# Patient Record
Sex: MALE | Race: BLACK OR AFRICAN AMERICAN | Employment: FULL TIME | URBAN - METROPOLITAN AREA
[De-identification: names, ages, dates, MRNs, and addresses within clinical notes are randomized per-mention and may not be internally consistent; named-entity substitution may affect disease eponyms.]

---

## 2017-07-12 ENCOUNTER — TELEPHONE (OUTPATIENT)
Dept: OTOLARYNGOLOGY | Facility: CLINIC | Age: 47
End: 2017-07-12

## 2017-07-12 ENCOUNTER — OFFICE VISIT (OUTPATIENT)
Dept: OTOLARYNGOLOGY | Facility: CLINIC | Age: 47
End: 2017-07-12

## 2017-07-12 VITALS
TEMPERATURE: 99 F | WEIGHT: 302 LBS | SYSTOLIC BLOOD PRESSURE: 128 MMHG | BODY MASS INDEX: 40.02 KG/M2 | HEIGHT: 73 IN | DIASTOLIC BLOOD PRESSURE: 64 MMHG

## 2017-07-12 DIAGNOSIS — J01.90 ACUTE SINUSITIS, RECURRENCE NOT SPECIFIED, UNSPECIFIED LOCATION: Primary | ICD-10-CM

## 2017-07-12 PROCEDURE — 99212 OFFICE O/P EST SF 10 MIN: CPT | Performed by: OTOLARYNGOLOGY

## 2017-07-12 PROCEDURE — 99214 OFFICE O/P EST MOD 30 MIN: CPT | Performed by: OTOLARYNGOLOGY

## 2017-07-12 RX ORDER — AMOXICILLIN AND CLAVULANATE POTASSIUM 875; 125 MG/1; MG/1
1 TABLET, FILM COATED ORAL EVERY 12 HOURS
Qty: 20 TABLET | Refills: 0 | Status: SHIPPED | OUTPATIENT
Start: 2017-07-12 | End: 2017-12-29 | Stop reason: ALTCHOICE

## 2017-07-12 RX ORDER — MONTELUKAST SODIUM 10 MG/1
10 TABLET ORAL NIGHTLY
Qty: 30 TABLET | Refills: 3 | Status: SHIPPED | OUTPATIENT
Start: 2017-07-12 | End: 2018-02-20

## 2017-07-12 NOTE — TELEPHONE ENCOUNTER
Patient requesting Loratadine to be sent to new [facility] pharm location since patients local pharm is out of medication and will not have any until 07/20/17.  Please send thank you

## 2017-07-12 NOTE — PROGRESS NOTES
Calvin Montiel is a 55year old male.   Patient presents with:  Sinus Problem: patient presents for sinus infection has been taking otc antihistamine and nasal sparay for 3 weeks without relief      HISTORY OF PRESENT ILLNESS  He presents with a three-week his PHYSICAL EXAM    /64 (BP Location: Left arm, Patient Position: Sitting, Cuff Size: large)   Temp 98.6 °F (37 °C) (Tympanic)   Ht 6' 1\" (1.854 m)   Wt (!) 302 lb (137 kg)   BMI 39.84 kg/m²        Constitutional Normal Overall appearance -ov 50 MCG/ACT Nasal Suspension, 2 sprays by Nasal route daily. , Disp: 1 Bottle, Rfl: 11  ASSESSMENT AND PLAN    1. Acute sinusitis, recurrence not specified, unspecified location  Continue Flonase but use twice daily.   I will start him on Augmentin for 10 day

## 2017-07-19 NOTE — TELEPHONE ENCOUNTER
Patient states wants prescription loratadine-Pseudoephedrine sent to a different pharmacy. Confirmed with pharmacy originally sent- [facility], with Hamida Barron and prescription was never picked up.  Called [facility] pharmacy in [facility] spoke to Gilmar kerr

## 2017-09-18 ENCOUNTER — OFFICE VISIT (OUTPATIENT)
Dept: OTOLARYNGOLOGY | Facility: CLINIC | Age: 47
End: 2017-09-18

## 2017-09-18 VITALS
HEIGHT: 73 IN | DIASTOLIC BLOOD PRESSURE: 82 MMHG | BODY MASS INDEX: 40.56 KG/M2 | TEMPERATURE: 98 F | SYSTOLIC BLOOD PRESSURE: 134 MMHG | WEIGHT: 306 LBS

## 2017-09-18 DIAGNOSIS — J01.21 ACUTE RECURRENT ETHMOIDAL SINUSITIS: ICD-10-CM

## 2017-09-18 DIAGNOSIS — H65.113 ACUTE MUCOID OTITIS MEDIA OF BOTH EARS: Primary | ICD-10-CM

## 2017-09-18 PROCEDURE — 99212 OFFICE O/P EST SF 10 MIN: CPT | Performed by: OTOLARYNGOLOGY

## 2017-09-18 PROCEDURE — 99213 OFFICE O/P EST LOW 20 MIN: CPT | Performed by: OTOLARYNGOLOGY

## 2017-09-18 RX ORDER — METHYLPREDNISOLONE 4 MG/1
TABLET ORAL
Qty: 1 PACKAGE | Refills: 0 | Status: SHIPPED | OUTPATIENT
Start: 2017-09-18 | End: 2017-12-29 | Stop reason: ALTCHOICE

## 2017-09-18 RX ORDER — PROMETHAZINE HYDROCHLORIDE AND CODEINE PHOSPHATE 6.25; 1 MG/5ML; MG/5ML
2.5 SYRUP ORAL EVERY 4 HOURS PRN
Qty: 200 ML | Refills: 0 | Status: SHIPPED | OUTPATIENT
Start: 2017-09-18 | End: 2018-06-18 | Stop reason: ALTCHOICE

## 2017-09-18 RX ORDER — AMOXICILLIN AND CLAVULANATE POTASSIUM 875; 125 MG/1; MG/1
1 TABLET, FILM COATED ORAL 2 TIMES DAILY
Qty: 28 TABLET | Refills: 0 | Status: SHIPPED | OUTPATIENT
Start: 2017-09-18 | End: 2017-10-02

## 2017-09-18 NOTE — PROGRESS NOTES
Daryl Miners is a 55year old male. Patient presents with:  Sinus Problem: infection for about a week, clogged ears, bad cough at night, clogeed head      HISTORY OF PRESENT ILLNESS  9/18/2017  Patient prevents for evaluation of sinusitis.  This is recurren Frequent skin infections, pigment change and rash. Hema/Lymph Negative Easy bleeding and easy bruising.            PHYSICAL EXAM    /82 (BP Location: Left arm)   Temp 98 °F (36.7 °C) (Tympanic)   Ht 6' 1\" (1.854 m)   Wt (!) 306 lb (138.8 kg)   BMI Disp: 1 Package, Rfl: 0  •  Fluticasone Propionate (FLONASE) 50 MCG/ACT Nasal Suspension, 2 sprays by Nasal route daily. , Disp: 1 Bottle, Rfl: 11  •  Amoxicillin-Pot Clavulanate 875-125 MG Oral Tab, Take 1 tablet by mouth every 12 (twelve) hours. , Disp: 20

## 2017-12-01 ENCOUNTER — OFFICE VISIT (OUTPATIENT)
Dept: OTOLARYNGOLOGY | Facility: CLINIC | Age: 47
End: 2017-12-01

## 2017-12-01 VITALS
TEMPERATURE: 97 F | HEIGHT: 73 IN | WEIGHT: 310 LBS | SYSTOLIC BLOOD PRESSURE: 118 MMHG | DIASTOLIC BLOOD PRESSURE: 70 MMHG | BODY MASS INDEX: 41.08 KG/M2

## 2017-12-01 DIAGNOSIS — R22.1 NECK MASS: Primary | ICD-10-CM

## 2017-12-01 PROBLEM — J34.2 DEVIATED SEPTUM: Status: ACTIVE | Noted: 2017-12-01

## 2017-12-01 PROCEDURE — 99214 OFFICE O/P EST MOD 30 MIN: CPT | Performed by: OTOLARYNGOLOGY

## 2017-12-01 PROCEDURE — 99212 OFFICE O/P EST SF 10 MIN: CPT | Performed by: OTOLARYNGOLOGY

## 2017-12-01 NOTE — PROGRESS NOTES
Ray Hoyos is a 52year old male.   Patient presents with:  Throat Problem: per pt he noticed lump at his throat for a week  Sinus Problem: post nasal drip, runny nose and sneezing      HISTORY OF PRESENT ILLNESS  9/18/2017  Patient prevents for evaluation status: Never Smoker                                                                Smokeless tobacco: Never Used                        Alcohol use: Yes             Drug use:  Yes                Comment: mirjuana -once a month      No family history on gia Submandibular. Anterior cervical. Posterior cervical. Supraclavicular.   Palpation of the neck reveals some fullness in the hyoid on the left and is submitted the lines are normal his submental exam is normal and not sure if this is just thickening of the h antihistamines. With regard to his breathing he does have a very significantly deviated septum into the right nasal cavity with compensatory turbinate hypertrophy.   This is something that surgery would fix but given that it does not bother him that much I

## 2017-12-05 ENCOUNTER — TELEPHONE (OUTPATIENT)
Dept: OTOLARYNGOLOGY | Facility: CLINIC | Age: 47
End: 2017-12-05

## 2017-12-05 NOTE — TELEPHONE ENCOUNTER
Left message for pt to call back to inform that he can schedule CT soft tissue of neck at this time, authorization number [id number], valid 12-5-17 thru 1-3-18 to be done at [facility]. It is the patient's responsibility to verify benefits a

## 2017-12-15 ENCOUNTER — HOSPITAL ENCOUNTER (OUTPATIENT)
Dept: CT IMAGING | Facility: HOSPITAL | Age: 47
Discharge: HOME | End: 2017-12-15
Attending: OTOLARYNGOLOGY
Payer: COMMERCIAL

## 2017-12-15 DIAGNOSIS — R22.1 NECK MASS: ICD-10-CM

## 2017-12-15 PROCEDURE — 70491 CT SOFT TISSUE NECK W/DYE: CPT | Performed by: OTOLARYNGOLOGY

## 2017-12-15 PROCEDURE — 82565 ASSAY OF CREATININE: CPT

## 2017-12-29 ENCOUNTER — OFFICE VISIT (OUTPATIENT)
Dept: OTOLARYNGOLOGY | Facility: CLINIC | Age: 47
End: 2017-12-29

## 2017-12-29 VITALS
TEMPERATURE: 98 F | HEIGHT: 73 IN | WEIGHT: 310 LBS | BODY MASS INDEX: 41.08 KG/M2 | DIASTOLIC BLOOD PRESSURE: 77 MMHG | SYSTOLIC BLOOD PRESSURE: 132 MMHG

## 2017-12-29 DIAGNOSIS — R22.1 NECK MASS: Primary | ICD-10-CM

## 2017-12-29 PROCEDURE — 99214 OFFICE O/P EST MOD 30 MIN: CPT | Performed by: OTOLARYNGOLOGY

## 2017-12-29 PROCEDURE — 99212 OFFICE O/P EST SF 10 MIN: CPT | Performed by: OTOLARYNGOLOGY

## 2017-12-29 NOTE — PROGRESS NOTES
Arnel Verde is a 52year old male. Patient presents with:   Follow - Up: CT scan of soft tissue neck done 12/15/17  Follow - Up: sinus problem- post nasal drip for 4 days      HISTORY OF PRESENT ILLNESS  9/18/2017  Patient prevents for evaluation of sinusi Number of children:               Social History Main Topics    Smoking status: Never Smoker                                                                Smokeless tobacco: Never Used                        Alcohol use:  Yes             Drug use: Yes Normal Inspection - Normal.        Lymph Detail   Normal Submental. Submandibular. Anterior cervical. Posterior cervical. Supraclavicular.   Palpation of the neck reveals complete resolution of previously noted lymph node lymphadenopathy              Nose/M

## 2018-01-08 ENCOUNTER — OFFICE VISIT (OUTPATIENT)
Dept: OTOLARYNGOLOGY | Facility: CLINIC | Age: 48
End: 2018-01-08

## 2018-01-08 VITALS
WEIGHT: 310 LBS | DIASTOLIC BLOOD PRESSURE: 84 MMHG | BODY MASS INDEX: 41.08 KG/M2 | HEIGHT: 73 IN | SYSTOLIC BLOOD PRESSURE: 128 MMHG | TEMPERATURE: 98 F

## 2018-01-08 DIAGNOSIS — J01.21 ACUTE RECURRENT ETHMOIDAL SINUSITIS: Primary | ICD-10-CM

## 2018-01-08 PROCEDURE — 99212 OFFICE O/P EST SF 10 MIN: CPT | Performed by: OTOLARYNGOLOGY

## 2018-01-08 PROCEDURE — 99214 OFFICE O/P EST MOD 30 MIN: CPT | Performed by: OTOLARYNGOLOGY

## 2018-01-08 RX ORDER — DEXAMETHASONE 4 MG/1
TABLET ORAL
Qty: 6 TABLET | Refills: 0 | Status: SHIPPED | OUTPATIENT
Start: 2018-01-08 | End: 2018-06-18 | Stop reason: ALTCHOICE

## 2018-01-08 RX ORDER — AMOXICILLIN AND CLAVULANATE POTASSIUM 875; 125 MG/1; MG/1
1 TABLET, FILM COATED ORAL EVERY 12 HOURS
Qty: 20 TABLET | Refills: 0 | Status: SHIPPED | OUTPATIENT
Start: 2018-01-08 | End: 2018-02-06 | Stop reason: ALTCHOICE

## 2018-01-08 NOTE — PROGRESS NOTES
Iveth Norris is a 52year old male. Patient presents with:   Follow - Up: 2 week follow up- nasal congestion- per pt he still congested, clogged both ears, facial pressure for 4 days      HISTORY OF PRESENT ILLNESS  9/18/2017  Patient prevents for evaluatio like he has a lot of pressure also much worse over the past 4 days he wonders how he can avoid having what he thinks is sinus infections he is on Flonase daily but is not on singular daily    Social History    Marital status:              Spouse nam Cranial nerves - Cranial nerves II through XII grossly intact.  Gait is normal   Head/Face Normal Facial features - Normal. Eyebrows - Normal. Skull - Normal.             Ears Normal Inspection - Right: Normal, Left: Normal. Canal - Right: Normal, Left: Nor Singulair daily in addition to the Flonase and see if this helps could also do allergy testing should this fail to improve reduce the frequency of his infections patient was instructed to call if symptoms do not resolve.  The risks of steroids were discusse

## 2018-02-06 ENCOUNTER — OFFICE VISIT (OUTPATIENT)
Dept: OTOLARYNGOLOGY | Facility: CLINIC | Age: 48
End: 2018-02-06

## 2018-02-06 ENCOUNTER — NURSE TRIAGE (OUTPATIENT)
Dept: OTHER | Age: 48
End: 2018-02-06

## 2018-02-06 VITALS
HEIGHT: 73 IN | SYSTOLIC BLOOD PRESSURE: 120 MMHG | TEMPERATURE: 97 F | DIASTOLIC BLOOD PRESSURE: 78 MMHG | WEIGHT: 310 LBS | BODY MASS INDEX: 41.08 KG/M2

## 2018-02-06 DIAGNOSIS — R05.9 COUGH: Primary | ICD-10-CM

## 2018-02-06 PROCEDURE — 99212 OFFICE O/P EST SF 10 MIN: CPT | Performed by: OTOLARYNGOLOGY

## 2018-02-06 PROCEDURE — 99213 OFFICE O/P EST LOW 20 MIN: CPT | Performed by: OTOLARYNGOLOGY

## 2018-02-06 NOTE — PROGRESS NOTES
Divina Vega is a 52year old male. Patient presents with: Follow - Up: 1 month follow up- recurrent sinusitis/nasal congestion-per pt he got better and got worse again a week ago    HPI:   He had the flu last week.   He started to feel much better with [facility] Normal Lips - Normal, Tonsils - Normal, Tongue - Normal    Nasal Normal External nose - Normal. Nasal septum - Normal, Turbinates - Normal   Neurological Normal Memory - Normal. Cranial nerves - Cranial nerves II through XII grossly intact.    Neck Exam Nor

## 2018-02-06 NOTE — TELEPHONE ENCOUNTER
Action Requested: Summary for Provider     []  Critical Lab, Recommendations Needed  [] Need Additional Advice  [x]   FYI    []   Need Orders  [] Need Medications Sent to Pharmacy  []  Other     SUMMARY:  Pt called office to schedule appt with [facility] for SOB,

## 2018-02-20 RX ORDER — MONTELUKAST SODIUM 10 MG/1
TABLET ORAL
Qty: 30 TABLET | Refills: 3 | Status: SHIPPED | OUTPATIENT
Start: 2018-02-20 | End: 2020-08-22

## 2018-04-30 ENCOUNTER — HOSPITAL ENCOUNTER (OUTPATIENT)
Dept: GENERAL RADIOLOGY | Facility: HOSPITAL | Age: 48
Discharge: HOME | End: 2018-04-30
Attending: OTOLARYNGOLOGY
Payer: COMMERCIAL

## 2018-04-30 ENCOUNTER — OFFICE VISIT (OUTPATIENT)
Dept: OTOLARYNGOLOGY | Facility: CLINIC | Age: 48
End: 2018-04-30

## 2018-04-30 VITALS
SYSTOLIC BLOOD PRESSURE: 130 MMHG | WEIGHT: 315 LBS | BODY MASS INDEX: 41.75 KG/M2 | DIASTOLIC BLOOD PRESSURE: 80 MMHG | TEMPERATURE: 97 F | HEIGHT: 73 IN

## 2018-04-30 DIAGNOSIS — R05.9 COUGH: ICD-10-CM

## 2018-04-30 DIAGNOSIS — R05.9 COUGH: Primary | ICD-10-CM

## 2018-04-30 DIAGNOSIS — H61.21 IMPACTED CERUMEN OF RIGHT EAR: ICD-10-CM

## 2018-04-30 PROCEDURE — 71045 X-RAY EXAM CHEST 1 VIEW: CPT | Performed by: OTOLARYNGOLOGY

## 2018-04-30 PROCEDURE — 99212 OFFICE O/P EST SF 10 MIN: CPT | Performed by: OTOLARYNGOLOGY

## 2018-04-30 PROCEDURE — 99214 OFFICE O/P EST MOD 30 MIN: CPT | Performed by: OTOLARYNGOLOGY

## 2018-04-30 PROCEDURE — 69210 REMOVE IMPACTED EAR WAX UNI: CPT | Performed by: OTOLARYNGOLOGY

## 2018-04-30 RX ORDER — BENZONATATE 100 MG/1
CAPSULE ORAL
COMMUNITY
Start: 2018-04-25 | End: 2018-06-18 | Stop reason: ALTCHOICE

## 2018-04-30 RX ORDER — PROMETHAZINE HYDROCHLORIDE AND CODEINE PHOSPHATE 6.25; 1 MG/5ML; MG/5ML
2.5 SYRUP ORAL EVERY 4 HOURS PRN
Qty: 200 ML | Refills: 0 | Status: SHIPPED | OUTPATIENT
Start: 2018-04-30 | End: 2018-06-18 | Stop reason: ALTCHOICE

## 2018-04-30 RX ORDER — AMOXICILLIN AND CLAVULANATE POTASSIUM 875; 125 MG/1; MG/1
TABLET, FILM COATED ORAL
COMMUNITY
Start: 2018-04-25 | End: 2018-06-18 | Stop reason: ALTCHOICE

## 2018-04-30 RX ORDER — PREDNISONE 20 MG/1
TABLET ORAL
COMMUNITY
Start: 2018-04-25 | End: 2018-06-18 | Stop reason: ALTCHOICE

## 2018-04-30 NOTE — PROGRESS NOTES
Rubin Alvse is a 52year old male.   Patient presents with:  Sinus Problem: congestion in head, nasal drainage, feel like head is going to explode, was seen in [facility] by  Nurse Practitioner  Ear Problem: right ear pain since wednesday      HISTORY OF PRESE medication is better he has some wheezing and some nasal congestion so he has a cough that keeps him up at night doctor gave him an inhaler for his lungs but he still feels like he has some wheezing.     Social History    Marital status:  Left: Normal. Pupil - Right: Normal, Left: Normal. Fundus - Right: Normal, Left: Normal. EOMI   Neurological Normal Memory - Normal. Cranial nerves - Cranial nerves II through XII grossly intact.  Gait is normal   Head/Face Normal Facial features - Normal. PLAN    1 resolving sinusitis  Overall he looks improved he does have some concerns about his breathing and wheezing and so I did ask him to get a chest x-ray today and continue his current medications and I gave him a cough suppressant if his wheezing con

## 2018-06-18 ENCOUNTER — OFFICE VISIT (OUTPATIENT)
Dept: OTOLARYNGOLOGY | Facility: CLINIC | Age: 48
End: 2018-06-18

## 2018-06-18 VITALS
WEIGHT: 315 LBS | HEIGHT: 73 IN | SYSTOLIC BLOOD PRESSURE: 134 MMHG | TEMPERATURE: 98 F | BODY MASS INDEX: 41.75 KG/M2 | DIASTOLIC BLOOD PRESSURE: 82 MMHG

## 2018-06-18 DIAGNOSIS — J30.9 ALLERGIC RHINITIS, UNSPECIFIED SEASONALITY, UNSPECIFIED TRIGGER: Primary | ICD-10-CM

## 2018-06-18 PROCEDURE — 99214 OFFICE O/P EST MOD 30 MIN: CPT | Performed by: OTOLARYNGOLOGY

## 2018-06-18 PROCEDURE — 99212 OFFICE O/P EST SF 10 MIN: CPT | Performed by: OTOLARYNGOLOGY

## 2018-06-18 NOTE — PROGRESS NOTES
Divina Vega is a 52year old male. Patient presents with:  Sinus Problem: frequent sinus infections, every 3-4 months       HISTORY OF PRESENT ILLNESS  He presents with a three-week history of chronic nasal congestion as well as mucopurulence.   He has abo Blood Pressure Brother        Past Medical History:   Diagnosis Date   • Chronic sinusitis 2013   • Deviated nasal septum    • Diverticulosis    • Elevated blood pressure reading 2015   • GERD (gastroesophageal reflux disease)    • Hypercholesterolemia 201 Normal. TM - Right: Normal, Left: Normal.   Skin Normal Inspection - Normal.        Lymph Detail Normal Submental. Submandibular. Anterior cervical. Posterior cervical. Supraclavicular.         Nose/Mouth/Throat Normal External nose - Normal. Lips/teeth/gum initiate a CT scan of the sinuses to evaluate his nasal and paranasal anatomy. Physical exam did demonstrate a slight deviation of the septum to the right but he does have significant nasal mucosal congestion chronically. Agueda Dawson.  Monique Reyes MD

## 2019-04-02 ENCOUNTER — OFFICE VISIT (OUTPATIENT)
Dept: OTOLARYNGOLOGY | Facility: CLINIC | Age: 49
End: 2019-04-02
Payer: COMMERCIAL

## 2019-04-02 VITALS
SYSTOLIC BLOOD PRESSURE: 114 MMHG | TEMPERATURE: 98 F | WEIGHT: 315 LBS | HEIGHT: 73 IN | DIASTOLIC BLOOD PRESSURE: 77 MMHG | BODY MASS INDEX: 41.75 KG/M2

## 2019-04-02 DIAGNOSIS — J30.9 ALLERGIC RHINITIS, UNSPECIFIED SEASONALITY, UNSPECIFIED TRIGGER: Primary | ICD-10-CM

## 2019-04-02 DIAGNOSIS — J34.2 DEVIATED NASAL SEPTUM: ICD-10-CM

## 2019-04-02 PROCEDURE — 99214 OFFICE O/P EST MOD 30 MIN: CPT | Performed by: OTOLARYNGOLOGY

## 2019-04-02 PROCEDURE — 99212 OFFICE O/P EST SF 10 MIN: CPT | Performed by: OTOLARYNGOLOGY

## 2019-04-02 RX ORDER — LEVOCETIRIZINE DIHYDROCHLORIDE 5 MG/1
5 TABLET, FILM COATED ORAL EVERY EVENING
Qty: 30 TABLET | Refills: 3 | Status: SHIPPED | OUTPATIENT
Start: 2019-04-02 | End: 2021-01-22

## 2019-04-02 NOTE — PROGRESS NOTES
Ion Fragoso is a 50year old male. Patient presents with:  Sinus Problem: pt reports recurring sinus imfections, ct scan done on 3/18/19      HISTORY OF PRESENT ILLNESS  He presents with a recent CT scan performed in the last 2 weeks.   States that he has pain and diarrhea. Endocrine Negative Cold intolerance and heat intolerance. Neuro Negative Tremors. Psych Negative Anxiety and depression. Integumentary Negative Frequent skin infections, pigment change and rash.    Hema/Lymph Negative Easy bleedin Nasal Suspension, 2 sprays by Nasal route daily. , Disp: 1 Bottle, Rfl: 0  •  MONTELUKAST SODIUM 10 MG Oral Tab, TAKE 1 TABLET BY MOUTH AT BEDTIME, Disp: 30 tablet, Rfl: 3  •  Loratadine-Pseudoephedrine ER 5-120 MG Oral Tablet 12 Hr, Take 1 tablet by mouth

## 2019-05-03 PROBLEM — M75.40 IMPINGEMENT SYNDROME OF SHOULDER REGION: Status: ACTIVE | Noted: 2019-02-06

## 2019-05-03 PROBLEM — G47.33 OBSTRUCTIVE SLEEP APNEA SYNDROME: Status: ACTIVE | Noted: 2018-06-07

## 2019-05-03 PROBLEM — M25.569 KNEE PAIN: Status: ACTIVE | Noted: 2019-02-06

## 2019-05-03 PROBLEM — J45.20 MILD INTERMITTENT ASTHMA WITHOUT COMPLICATION (HCC): Status: ACTIVE | Noted: 2019-05-03

## 2019-05-03 PROBLEM — K62.5 RECTAL BLEEDING: Status: ACTIVE | Noted: 2019-05-03

## 2019-05-03 PROBLEM — E55.9 VITAMIN D DEFICIENCY: Status: ACTIVE | Noted: 2018-06-07

## 2019-05-03 PROBLEM — E66.01 MORBID OBESITY (HCC): Status: ACTIVE | Noted: 2019-02-06

## 2019-05-03 PROBLEM — J45.20 MILD INTERMITTENT ASTHMA WITHOUT COMPLICATION: Status: ACTIVE | Noted: 2019-05-03

## 2019-05-13 ENCOUNTER — TELEPHONE (OUTPATIENT)
Dept: OTOLARYNGOLOGY | Facility: CLINIC | Age: 49
End: 2019-05-13

## 2019-05-13 NOTE — TELEPHONE ENCOUNTER
Pt contacted, cancelled surgery schedule for 5/20 with Dr. Duane Meyer. Pt will call back to reschedule once the medical clearance has been completed.

## 2019-07-30 ENCOUNTER — TELEPHONE (OUTPATIENT)
Dept: OTOLARYNGOLOGY | Facility: CLINIC | Age: 49
End: 2019-07-30

## 2019-07-30 NOTE — TELEPHONE ENCOUNTER
Called and spoke with patient. Informed patient last time we spoke with patient we had cancelled sx on 05/20 and was advised to get Medical clearance prior to rescheduling surgery. Patient remembered. Patient states will have pre op appt on 09/09/19.  Per

## 2019-09-10 ENCOUNTER — TELEPHONE (OUTPATIENT)
Dept: OTOLARYNGOLOGY | Facility: CLINIC | Age: 49
End: 2019-09-10

## 2019-09-10 NOTE — TELEPHONE ENCOUNTER
office requesting to speak with RN. [facility] pt is in office for pre-op clearance and did not bring any OV notes. Wants to know what would the pt be tested for?

## 2019-09-10 NOTE — TELEPHONE ENCOUNTER
Rn spoke to PCP staff and  Advised that we need a medical clearance from Dr Sherrie Del Toro before surgery.

## 2019-10-28 ENCOUNTER — TELEPHONE (OUTPATIENT)
Dept: OTOLARYNGOLOGY | Facility: CLINIC | Age: 49
End: 2019-10-28

## 2019-10-28 NOTE — TELEPHONE ENCOUNTER
Called Dr Jessica James office to request medical clearance to be faxed to our office. Called patient to inform medical clearance for surgery was never received from PCPs office.  Informed patient that I did leave a message to PCPs office to fax medical clearan

## 2019-10-28 NOTE — TELEPHONE ENCOUNTER
Pt requesting to speak with RN to know if the office have everything that is needed for medical clearance to proceed with sx.

## 2019-10-29 NOTE — TELEPHONE ENCOUNTER
Per Dr Kyle Blas patient will also need to come back to clinic to costa surgery since LOV was on 4/2/19    Called patient to inform.  Patient set up appt for 10/31/19 at 3pm to costa evans

## 2019-10-29 NOTE — TELEPHONE ENCOUNTER
Late entry: spoke with Dr Hadley James MA on 10/28/19 states patient is still not cleared for surgery due to not having lab work done.  Per MA patient is to come into office today to complete lab work and pending results may then clear patient for surgery

## 2019-10-31 ENCOUNTER — OFFICE VISIT (OUTPATIENT)
Dept: OTOLARYNGOLOGY | Facility: CLINIC | Age: 49
End: 2019-10-31
Payer: COMMERCIAL

## 2019-10-31 VITALS
TEMPERATURE: 98 F | SYSTOLIC BLOOD PRESSURE: 115 MMHG | DIASTOLIC BLOOD PRESSURE: 80 MMHG | BODY MASS INDEX: 41.75 KG/M2 | WEIGHT: 315 LBS | HEIGHT: 73 IN

## 2019-10-31 DIAGNOSIS — J30.9 ALLERGIC RHINITIS, UNSPECIFIED SEASONALITY, UNSPECIFIED TRIGGER: ICD-10-CM

## 2019-10-31 DIAGNOSIS — J34.2 DEVIATED NASAL SEPTUM: Primary | ICD-10-CM

## 2019-10-31 PROCEDURE — 99214 OFFICE O/P EST MOD 30 MIN: CPT | Performed by: OTOLARYNGOLOGY

## 2019-11-08 ENCOUNTER — TELEPHONE (OUTPATIENT)
Dept: OTOLARYNGOLOGY | Facility: CLINIC | Age: 49
End: 2019-11-08

## 2019-11-08 NOTE — PROGRESS NOTES
London Peters is a 52year old male. Patient presents with: Follow - Up: deviated septum- LOV-4/2/19      HISTORY OF PRESENT ILLNESS  He presents with a recent CT scan performed in the last 2 weeks.   States that he has primary difficulty breathing Other (Diverticulitis) Father        Past Medical History:   Diagnosis Date   • Chronic sinusitis 2013   • Deviated nasal septum    • Diverticulosis    • Elevated blood pressure reading 2015   • GERD (gastroesophageal reflux disease)    • History of divert Normal. Oropharynx - Normal.   Ears Normal Inspection - Right: Normal, Left: Normal. Canal - Right: Normal, Left: Normal. TM - Right: Normal, Left: Normal.   Skin Normal Inspection - Normal.        Lymph Detail Normal Submental. Submandibular.  Anterior cer Allergy medications have helped slightly. We had talked about surgery back in April of this year but unfortunately things came up and he was unable to proceed with surgery. No real changes in his history and physical at this time.   We once again discusse

## 2019-11-21 RX ORDER — ACETAMINOPHEN 325 MG/1
325 TABLET ORAL EVERY 6 HOURS PRN
COMMUNITY
End: 2020-02-06

## 2019-11-25 ENCOUNTER — ANESTHESIA EVENT (OUTPATIENT)
Dept: SURGERY | Facility: HOSPITAL | Age: 49
End: 2019-11-25
Payer: COMMERCIAL

## 2019-11-25 ENCOUNTER — ANESTHESIA (OUTPATIENT)
Dept: SURGERY | Facility: HOSPITAL | Age: 49
End: 2019-11-25
Payer: COMMERCIAL

## 2019-11-25 ENCOUNTER — HOSPITAL ENCOUNTER (OUTPATIENT)
Facility: HOSPITAL | Age: 49
Setting detail: HOSPITAL OUTPATIENT SURGERY
Discharge: HOME | End: 2019-11-25
Attending: OTOLARYNGOLOGY | Admitting: OTOLARYNGOLOGY
Payer: COMMERCIAL

## 2019-11-25 VITALS
WEIGHT: 315 LBS | TEMPERATURE: 98 F | RESPIRATION RATE: 18 BRPM | OXYGEN SATURATION: 92 % | DIASTOLIC BLOOD PRESSURE: 84 MMHG | HEART RATE: 84 BPM | SYSTOLIC BLOOD PRESSURE: 148 MMHG | HEIGHT: 73 IN | BODY MASS INDEX: 41.75 KG/M2

## 2019-11-25 DIAGNOSIS — J34.2 DEVIATED NASAL SEPTUM: ICD-10-CM

## 2019-11-25 DIAGNOSIS — J34.3 HYPERTROPHY OF NASAL TURBINATES: ICD-10-CM

## 2019-11-25 PROCEDURE — 09SL7ZZ REPOSITION NASAL TURBINATE, VIA NATURAL OR ARTIFICIAL OPENING: ICD-10-PCS | Performed by: OTOLARYNGOLOGY

## 2019-11-25 PROCEDURE — 30520 REPAIR OF NASAL SEPTUM: CPT | Performed by: OTOLARYNGOLOGY

## 2019-11-25 PROCEDURE — 30140 RESECT INFERIOR TURBINATE: CPT | Performed by: OTOLARYNGOLOGY

## 2019-11-25 PROCEDURE — 09BM0ZZ EXCISION OF NASAL SEPTUM, OPEN APPROACH: ICD-10-PCS | Performed by: OTOLARYNGOLOGY

## 2019-11-25 PROCEDURE — 095L7ZZ DESTRUCTION OF NASAL TURBINATE, VIA NATURAL OR ARTIFICIAL OPENING: ICD-10-PCS | Performed by: OTOLARYNGOLOGY

## 2019-11-25 RX ORDER — HYDROCODONE BITARTRATE AND ACETAMINOPHEN 5; 325 MG/1; MG/1
1 TABLET ORAL EVERY 4 HOURS PRN
Status: DISCONTINUED | OUTPATIENT
Start: 2019-11-25 | End: 2019-11-25

## 2019-11-25 RX ORDER — ACETAMINOPHEN 325 MG/1
650 TABLET ORAL EVERY 4 HOURS PRN
Status: DISCONTINUED | OUTPATIENT
Start: 2019-11-25 | End: 2019-11-25

## 2019-11-25 RX ORDER — HYDROMORPHONE HYDROCHLORIDE 1 MG/ML
0.2 INJECTION, SOLUTION INTRAMUSCULAR; INTRAVENOUS; SUBCUTANEOUS EVERY 5 MIN PRN
Status: DISCONTINUED | OUTPATIENT
Start: 2019-11-25 | End: 2019-11-25

## 2019-11-25 RX ORDER — CEPHALEXIN 500 MG/1
500 CAPSULE ORAL EVERY 8 HOURS
Qty: 21 CAPSULE | Refills: 0 | Status: SHIPPED | OUTPATIENT
Start: 2019-11-25 | End: 2020-02-06 | Stop reason: ALTCHOICE

## 2019-11-25 RX ORDER — PROCHLORPERAZINE EDISYLATE 5 MG/ML
5 INJECTION INTRAMUSCULAR; INTRAVENOUS ONCE AS NEEDED
Status: DISCONTINUED | OUTPATIENT
Start: 2019-11-25 | End: 2019-11-25

## 2019-11-25 RX ORDER — ACETAMINOPHEN 160 MG/5ML
650 SOLUTION ORAL EVERY 4 HOURS PRN
Status: DISCONTINUED | OUTPATIENT
Start: 2019-11-25 | End: 2019-11-25

## 2019-11-25 RX ORDER — DEXAMETHASONE SODIUM PHOSPHATE 4 MG/ML
VIAL (ML) INJECTION AS NEEDED
Status: DISCONTINUED | OUTPATIENT
Start: 2019-11-25 | End: 2019-11-25 | Stop reason: SURG

## 2019-11-25 RX ORDER — MORPHINE SULFATE 2 MG/ML
2 INJECTION, SOLUTION INTRAMUSCULAR; INTRAVENOUS EVERY 10 MIN PRN
Status: DISCONTINUED | OUTPATIENT
Start: 2019-11-25 | End: 2019-11-25

## 2019-11-25 RX ORDER — HYDROCODONE BITARTRATE AND ACETAMINOPHEN 7.5; 325 MG/1; MG/1
1 TABLET ORAL EVERY 6 HOURS PRN
Qty: 30 TABLET | Refills: 0 | Status: SHIPPED | OUTPATIENT
Start: 2019-11-25 | End: 2020-02-06 | Stop reason: ALTCHOICE

## 2019-11-25 RX ORDER — ONDANSETRON 2 MG/ML
INJECTION INTRAMUSCULAR; INTRAVENOUS AS NEEDED
Status: DISCONTINUED | OUTPATIENT
Start: 2019-11-25 | End: 2019-11-25 | Stop reason: SURG

## 2019-11-25 RX ORDER — FAMOTIDINE 20 MG/1
20 TABLET ORAL ONCE
Status: COMPLETED | OUTPATIENT
Start: 2019-11-25 | End: 2019-11-25

## 2019-11-25 RX ORDER — HYDROCODONE BITARTRATE AND ACETAMINOPHEN 5; 325 MG/1; MG/1
1 TABLET ORAL AS NEEDED
Status: DISCONTINUED | OUTPATIENT
Start: 2019-11-25 | End: 2019-11-25

## 2019-11-25 RX ORDER — NALOXONE HYDROCHLORIDE 0.4 MG/ML
80 INJECTION, SOLUTION INTRAMUSCULAR; INTRAVENOUS; SUBCUTANEOUS AS NEEDED
Status: DISCONTINUED | OUTPATIENT
Start: 2019-11-25 | End: 2019-11-25

## 2019-11-25 RX ORDER — LIDOCAINE HYDROCHLORIDE AND EPINEPHRINE 10; 10 MG/ML; UG/ML
INJECTION, SOLUTION INFILTRATION; PERINEURAL AS NEEDED
Status: DISCONTINUED | OUTPATIENT
Start: 2019-11-25 | End: 2019-11-25 | Stop reason: HOSPADM

## 2019-11-25 RX ORDER — SODIUM CHLORIDE 0.9 % (FLUSH) 0.9 %
10 SYRINGE (ML) INJECTION AS NEEDED
Status: DISCONTINUED | OUTPATIENT
Start: 2019-11-25 | End: 2019-11-25

## 2019-11-25 RX ORDER — ONDANSETRON 4 MG/1
4 TABLET, ORALLY DISINTEGRATING ORAL EVERY 6 HOURS PRN
Status: DISCONTINUED | OUTPATIENT
Start: 2019-11-25 | End: 2019-11-25

## 2019-11-25 RX ORDER — HALOPERIDOL 5 MG/ML
0.25 INJECTION INTRAMUSCULAR ONCE AS NEEDED
Status: DISCONTINUED | OUTPATIENT
Start: 2019-11-25 | End: 2019-11-25

## 2019-11-25 RX ORDER — SODIUM CHLORIDE, SODIUM LACTATE, POTASSIUM CHLORIDE, CALCIUM CHLORIDE 600; 310; 30; 20 MG/100ML; MG/100ML; MG/100ML; MG/100ML
INJECTION, SOLUTION INTRAVENOUS CONTINUOUS
Status: DISCONTINUED | OUTPATIENT
Start: 2019-11-25 | End: 2019-11-25

## 2019-11-25 RX ORDER — MORPHINE SULFATE 10 MG/ML
6 INJECTION, SOLUTION INTRAMUSCULAR; INTRAVENOUS EVERY 10 MIN PRN
Status: DISCONTINUED | OUTPATIENT
Start: 2019-11-25 | End: 2019-11-25

## 2019-11-25 RX ORDER — GLYCOPYRROLATE 0.2 MG/ML
INJECTION INTRAMUSCULAR; INTRAVENOUS AS NEEDED
Status: DISCONTINUED | OUTPATIENT
Start: 2019-11-25 | End: 2019-11-25 | Stop reason: SURG

## 2019-11-25 RX ORDER — HYDROMORPHONE HYDROCHLORIDE 1 MG/ML
0.4 INJECTION, SOLUTION INTRAMUSCULAR; INTRAVENOUS; SUBCUTANEOUS EVERY 5 MIN PRN
Status: DISCONTINUED | OUTPATIENT
Start: 2019-11-25 | End: 2019-11-25

## 2019-11-25 RX ORDER — ACETAMINOPHEN 500 MG
1000 TABLET ORAL ONCE
Status: COMPLETED | OUTPATIENT
Start: 2019-11-25 | End: 2019-11-25

## 2019-11-25 RX ORDER — HYDROMORPHONE HYDROCHLORIDE 1 MG/ML
0.6 INJECTION, SOLUTION INTRAMUSCULAR; INTRAVENOUS; SUBCUTANEOUS EVERY 5 MIN PRN
Status: DISCONTINUED | OUTPATIENT
Start: 2019-11-25 | End: 2019-11-25

## 2019-11-25 RX ORDER — ONDANSETRON 2 MG/ML
4 INJECTION INTRAMUSCULAR; INTRAVENOUS EVERY 6 HOURS PRN
Status: DISCONTINUED | OUTPATIENT
Start: 2019-11-25 | End: 2019-11-25

## 2019-11-25 RX ORDER — MORPHINE SULFATE 4 MG/ML
4 INJECTION, SOLUTION INTRAMUSCULAR; INTRAVENOUS EVERY 10 MIN PRN
Status: DISCONTINUED | OUTPATIENT
Start: 2019-11-25 | End: 2019-11-25

## 2019-11-25 RX ORDER — LIDOCAINE HYDROCHLORIDE 10 MG/ML
INJECTION, SOLUTION EPIDURAL; INFILTRATION; INTRACAUDAL; PERINEURAL AS NEEDED
Status: DISCONTINUED | OUTPATIENT
Start: 2019-11-25 | End: 2019-11-25 | Stop reason: SURG

## 2019-11-25 RX ORDER — ONDANSETRON 2 MG/ML
4 INJECTION INTRAMUSCULAR; INTRAVENOUS ONCE AS NEEDED
Status: DISCONTINUED | OUTPATIENT
Start: 2019-11-25 | End: 2019-11-25

## 2019-11-25 RX ORDER — HYDROCODONE BITARTRATE AND ACETAMINOPHEN 5; 325 MG/1; MG/1
2 TABLET ORAL AS NEEDED
Status: DISCONTINUED | OUTPATIENT
Start: 2019-11-25 | End: 2019-11-25

## 2019-11-25 RX ORDER — METOCLOPRAMIDE 10 MG/1
10 TABLET ORAL ONCE
Status: COMPLETED | OUTPATIENT
Start: 2019-11-25 | End: 2019-11-25

## 2019-11-25 RX ADMIN — LIDOCAINE HYDROCHLORIDE 50 MG: 10 INJECTION, SOLUTION EPIDURAL; INFILTRATION; INTRACAUDAL; PERINEURAL at 13:19:00

## 2019-11-25 RX ADMIN — DEXAMETHASONE SODIUM PHOSPHATE 4 MG: 4 MG/ML VIAL (ML) INJECTION at 13:25:00

## 2019-11-25 RX ADMIN — SODIUM CHLORIDE, SODIUM LACTATE, POTASSIUM CHLORIDE, CALCIUM CHLORIDE: 600; 310; 30; 20 INJECTION, SOLUTION INTRAVENOUS at 14:00:00

## 2019-11-25 RX ADMIN — ONDANSETRON 4 MG: 2 INJECTION INTRAMUSCULAR; INTRAVENOUS at 13:25:00

## 2019-11-25 RX ADMIN — GLYCOPYRROLATE 0.2 MG: 0.2 INJECTION INTRAMUSCULAR; INTRAVENOUS at 13:18:00

## 2019-11-25 NOTE — INTERVAL H&P NOTE
Pre-op Diagnosis: Deviated nasal septum [J34.2]  Hypertrophy of nasal turbinates [J34.3]    The above referenced H&P was reviewed by Agueda Dawson.  Monique Reyes MD on 11/25/2019, the patient was examined and no significant changes have occurred in the patient's condit

## 2019-11-25 NOTE — ANESTHESIA POSTPROCEDURE EVALUATION
Patient: London Peters    Procedure Summary     Date:  11/25/19 Room / Location:  [address] [place] [address] MAIN OR 03 / [facility] MAIN OR    Anesthesia Start:  1738 Anesthesia Stop:  2257    Procedure:  NASAL SEPTOPLASTY TURBINECTOMY (Bilateral Nose) Diagnosis:       Deviated

## 2019-11-25 NOTE — ANESTHESIA PROCEDURE NOTES
Airway  Urgency: Elective      General Information and Staff    Patient location during procedure: OR  Anesthesiologist: Anna Cheng MD  Resident/CRNA: Jericho Caban, CRNA  Performed: CRNA     Indications and Patient Condition  Indications for airway lake

## 2019-11-25 NOTE — H&P
HISTORY OF PRESENT ILLNESS  He presents with a recent CT scan performed in the last 2 weeks.  States that he has primary difficulty breathing through his nose but also complains of chronic sneezing congestive issues.  Currently on Singulair and a nasal spr 2013   • Deviated nasal septum     • Diverticulosis     • Elevated blood pressure reading 2015   • GERD (gastroesophageal reflux disease)     • History of diverticulitis     • Hypercholesterolemia 2013   • Morbid obesity due to excess calories [facility])     • Normal. Canal - Right: Normal, Left: Normal. TM - Right: Normal, Left: Normal.   Skin Normal Inspection - Normal.           Lymph Detail Normal Submental. Submandibular. Anterior cervical. Posterior cervical. Supraclavicular.    Respiratory    normal auscul back in April of this year but unfortunately things came up and he was unable to proceed with surgery. No real changes in his history and physical at this time.   We once again discussed the risks of surgery to correct his deviated septum and to reduce his

## 2019-11-25 NOTE — ANESTHESIA PREPROCEDURE EVALUATION
Anesthesia PreOp Note    HPI:     Nano Burns is a 52year old male who presents for preoperative consultation requested by: Tsering Ludwig MD    Date of Surgery: 11/25/2019    Procedure(s):  NASAL SEPTOPLASTY TURBINECTOMY  Indication: Deviated n apnea     uses CPAP   • Torn meniscus     LEFT LEG   • Vitamin D deficiency 6/7/2018       Past Surgical History:   Procedure Laterality Date   • COLONOSCOPY         acetaminophen 325 MG Oral Tab, Take 325 mg by mouth every 6 (six) hours as needed for Pain Activity      Alcohol use: Yes        Frequency: 2-4 times a month      Drug use: Not Currently      Sexual activity: Not on file    Lifestyle      Physical activity:        Days per week: Not on file        Minutes per session: Not on file      Stress: No normal.               Anesthesia Plan:   ASA:  2  Plan:   General  Airway:  ETT and Video laryngoscope  Informed Consent Plan and Risks Discussed With:  Patient and spouse  Discussed plan with:  CRNA, attending and surgeon  Provider Attestation (if preop d

## 2019-11-25 NOTE — BRIEF OP NOTE
Pre-Operative Diagnosis: Deviated nasal septum [J34.2]  Hypertrophy of nasal turbinates [J34.3]     Post-Operative Diagnosis: Deviated nasal septum [J79. 2]Hypertrophy of nasal turbinates [J34.3]      Procedure Performed:   Procedure(s):  Septoplasty,Bilate

## 2019-11-26 ENCOUNTER — TELEPHONE (OUTPATIENT)
Dept: OTOLARYNGOLOGY | Facility: CLINIC | Age: 49
End: 2019-11-26

## 2019-11-26 NOTE — TELEPHONE ENCOUNTER
Pt is post op day 1 , septoplasty, SMR. Per  Pt pt is doing well, still having some light bleeding, advised pt no bending or heavy lifting for the next week and pt is not to blow nose until seen by .  Advised pt she can start using OTC saline nasal

## 2019-12-02 NOTE — OPERATIVE REPORT
[facility]    PATIENT'S NAME: Linnea AVILA   ATTENDING PHYSICIAN: Vel Monreal. Precious Méndez MD   OPERATING PHYSICIAN: Vel Monreal.  Precious Méndez MD   PATIENT ACCOUNT#:   [id number]    LOCATION:  [address]  MEDICAL RECORD #:   [id number]       DATE osteotome, and a  Dillon elevator. The mucoperichondrial flaps were then placed into midline position and sutured into this position using a 4-0 plain gut transeptal  mattress stitch.   The hemitransfixion incision on the right was then closed using a 5-0 f

## 2019-12-03 ENCOUNTER — OFFICE VISIT (OUTPATIENT)
Dept: OTOLARYNGOLOGY | Facility: CLINIC | Age: 49
End: 2019-12-03
Payer: COMMERCIAL

## 2019-12-03 VITALS
DIASTOLIC BLOOD PRESSURE: 72 MMHG | HEIGHT: 73 IN | WEIGHT: 315 LBS | TEMPERATURE: 98 F | SYSTOLIC BLOOD PRESSURE: 122 MMHG | BODY MASS INDEX: 41.75 KG/M2

## 2019-12-03 DIAGNOSIS — J34.2 DEVIATED NASAL SEPTUM: Primary | ICD-10-CM

## 2019-12-03 DIAGNOSIS — J30.9 ALLERGIC RHINITIS, UNSPECIFIED SEASONALITY, UNSPECIFIED TRIGGER: ICD-10-CM

## 2019-12-03 PROCEDURE — 99024 POSTOP FOLLOW-UP VISIT: CPT | Performed by: OTOLARYNGOLOGY

## 2019-12-03 NOTE — PROGRESS NOTES
Bee Holloway is a 52year old male.   Patient presents with:  Post-Op: septoplasty, smr done on 11/25/19      HISTORY OF PRESENT ILLNESS  He presents with a recent CT scan performed in the last 2 weeks.  States that he has primary difficulty breathi Not Currently      Family History   Problem Relation Age of Onset   • Diabetes Brother    • High Blood Pressure Brother    • Heart Disorder Brother    • Hypertension Brother    • Hypertension Father    • Other (Diverticulitis) Father        Past Medical Hi situation. Appropriate mood and affect.    Neck Exam Normal Inspection - Normal. Palpation - Normal. Parotid gland - Normal. Thyroid gland - Normal.   Eyes Normal Conjunctiva - Right: Normal, Left: Normal. Pupil - Right: Normal, Left: Normal. Fundus - Right taking: Reported on 12/3/2019 ), Disp: 30 tablet, Rfl: 0  ASSESSMENT AND PLAN    1. Deviated nasal septum    2. Allergic rhinitis, unspecified seasonality, unspecified trigger  Septum midline. Nose cleaned bilaterally breathing much better.   Return to see

## 2019-12-31 ENCOUNTER — TELEPHONE (OUTPATIENT)
Dept: OTOLARYNGOLOGY | Facility: CLINIC | Age: 49
End: 2019-12-31

## 2019-12-31 NOTE — TELEPHONE ENCOUNTER
pt wants to know what can he take for congestion in ear and sinus, which pt. Has had symptoms for the past week? Pt.states that he had surgery on 11/18/19.

## 2020-01-02 NOTE — TELEPHONE ENCOUNTER
Informed patient of note below and verbalized understanding and advised to call our office if no better.

## 2020-02-06 ENCOUNTER — OFFICE VISIT (OUTPATIENT)
Dept: INTERNAL MEDICINE CLINIC | Facility: CLINIC | Age: 50
End: 2020-02-06
Payer: COMMERCIAL

## 2020-02-06 VITALS
HEIGHT: 71.5 IN | WEIGHT: 315 LBS | TEMPERATURE: 99 F | RESPIRATION RATE: 22 BRPM | SYSTOLIC BLOOD PRESSURE: 128 MMHG | OXYGEN SATURATION: 95 % | DIASTOLIC BLOOD PRESSURE: 84 MMHG | BODY MASS INDEX: 43.13 KG/M2 | HEART RATE: 76 BPM

## 2020-02-06 DIAGNOSIS — E78.5 HYPERLIPIDEMIA, UNSPECIFIED HYPERLIPIDEMIA TYPE: ICD-10-CM

## 2020-02-06 DIAGNOSIS — E66.01 MORBID OBESITY (HCC): ICD-10-CM

## 2020-02-06 DIAGNOSIS — E55.9 VITAMIN D DEFICIENCY: Primary | ICD-10-CM

## 2020-02-06 DIAGNOSIS — K57.90 DIVERTICULOSIS: ICD-10-CM

## 2020-02-06 DIAGNOSIS — I48.0 PAROXYSMAL ATRIAL FIBRILLATION (HCC): ICD-10-CM

## 2020-02-06 DIAGNOSIS — G89.29 CHRONIC PAIN OF RIGHT KNEE: ICD-10-CM

## 2020-02-06 DIAGNOSIS — G47.33 OBSTRUCTIVE SLEEP APNEA SYNDROME: ICD-10-CM

## 2020-02-06 DIAGNOSIS — M25.561 CHRONIC PAIN OF RIGHT KNEE: ICD-10-CM

## 2020-02-06 PROCEDURE — 99205 OFFICE O/P NEW HI 60 MIN: CPT | Performed by: INTERNAL MEDICINE

## 2020-02-06 RX ORDER — MELOXICAM 15 MG/1
15 TABLET ORAL DAILY PRN
Status: ON HOLD | COMMUNITY
Start: 2020-01-23 | End: 2020-04-11

## 2020-02-07 NOTE — ASSESSMENT & PLAN NOTE
History of diverticulosis. Recent colonoscopy reviewed. Will need a follow-up colonoscopy in about 3 years. Colonoscopy due on 06/14/2022  Patient is otherwise asymptomatic.   Patient is advised to start on a fiber supplement– Metamucil, Citrucel or Bene

## 2020-02-07 NOTE — ASSESSMENT & PLAN NOTE
Body mass index is 46.62 kg/m².      Wt Readings from Last 6 Encounters:  02/06/20 : (!) 339 lb (153.8 kg)  12/03/19 : (!) 330 lb (149.7 kg)  11/25/19 : (!) 330 lb 8 oz (149.9 kg)  10/31/19 : (!) 330 lb (149.7 kg)  05/03/19 : (!) 334 lb 6.4 oz (151.7 kg)  0

## 2020-02-07 NOTE — ASSESSMENT & PLAN NOTE
History of paroxysmal atrial fibrillation–long time back. No significant recurrences recently. Last records not available here for evaluation. Patient will have some of his old records forwarded.

## 2020-02-07 NOTE — ASSESSMENT & PLAN NOTE
Patient is currently on a CPAP, has been tolerating the CPAP better after septoplasty. He has not had a recent echocardiogram–will need a follow-up echo to monitor.

## 2020-02-07 NOTE — PATIENT INSTRUCTIONS
Problem List Items Addressed This Visit        Unprioritized    Diverticulosis     History of diverticulosis. Recent colonoscopy reviewed. Will need a follow-up colonoscopy in about 3 years.   Colonoscopy due on 06/14/2022  Patient is otherwise asymptomat improve exercise tolerance. Relevant Orders    CARD ECHO 2D DOPPLER (CPT=93306)    Obstructive sleep apnea syndrome     Patient is currently on a CPAP, has been tolerating the CPAP better after septoplasty.   He has not had a recent echocardiogram–w

## 2020-02-07 NOTE — ASSESSMENT & PLAN NOTE
Soreness in the left knee, has been through meniscal repair surgery in December 2019. Continues to have some restriction in range of movement. Exercises discussed. Continue on physical therapy as discussed.

## 2020-02-07 NOTE — ASSESSMENT & PLAN NOTE
History of vitamin D deficiency. Last labs completed in July of last year. Recheck labs have been ordered.

## 2020-02-12 ENCOUNTER — TELEPHONE (OUTPATIENT)
Dept: OTOLARYNGOLOGY | Facility: CLINIC | Age: 50
End: 2020-02-12

## 2020-02-12 NOTE — TELEPHONE ENCOUNTER
Per pt now not having any bleeding now , bleeding has stopped. Lasted about 15 min. Pt states just sneezed. Reviewed nose bleed precautions. Pt states he was using flonase nasal spray due to having post nasal drip and blowing nose frequently.  Advised pt no

## 2020-02-12 NOTE — TELEPHONE ENCOUNTER
Per pt had surgery in November. States he sneezed today and a blood started gushing out about 15 minutes ago. States took allergy medication and flonaze.  Please advise

## 2020-02-12 NOTE — TELEPHONE ENCOUNTER
, pt states has been having a lot of post nasal drip, pt using OTC allergy medication does not know med and Singulair, any other recommendations ?

## 2020-02-13 LAB
ABSOLUTE BASOPHILS: 50 CELLS/UL (ref 0–200)
ABSOLUTE EOSINOPHILS: 193 CELLS/UL (ref 15–500)
ABSOLUTE LYMPHOCYTES: 2591 CELLS/UL (ref 850–3900)
ABSOLUTE MONOCYTES: 528 CELLS/UL (ref 200–950)
ABSOLUTE NEUTROPHILS: 2140 CELLS/UL (ref 1500–7800)
ALBUMIN/GLOBULIN RATIO: 1.2 (CALC) (ref 1–2.5)
ALBUMIN: 4.3 G/DL (ref 3.6–5.1)
ALKALINE PHOSPHATASE: 88 U/L (ref 36–130)
ALT: 22 U/L (ref 9–46)
APPEARANCE: CLEAR
AST: 22 U/L (ref 10–40)
BASOPHILS: 0.9 %
BILIRUBIN, TOTAL: 0.6 MG/DL (ref 0.2–1.2)
BILIRUBIN: NEGATIVE
BUN: 13 MG/DL (ref 7–25)
CALCIUM: 10.1 MG/DL (ref 8.6–10.3)
CARBON DIOXIDE: 26 MMOL/L (ref 20–32)
CHLORIDE: 103 MMOL/L (ref 98–110)
CHOL/HDLC RATIO: 4.3 (CALC)
CHOLESTEROL, TOTAL: 181 MG/DL
COLOR: YELLOW
CREATININE: 1.12 MG/DL (ref 0.6–1.35)
EGFR IF AFRICN AM: 89 ML/MIN/1.73M2
EGFR IF NONAFRICN AM: 77 ML/MIN/1.73M2
EOSINOPHILS: 3.5 %
GLOBULIN: 3.5 G/DL (CALC) (ref 1.9–3.7)
GLUCOSE: 92 MG/DL (ref 65–99)
GLUCOSE: NEGATIVE
HDL CHOLESTEROL: 42 MG/DL
HEMATOCRIT: 45.1 % (ref 38.5–50)
HEMOGLOBIN: 14.7 G/DL (ref 13.2–17.1)
KETONES: NEGATIVE
LDL-CHOLESTEROL: 122 MG/DL (CALC)
LEUKOCYTE ESTERASE: NEGATIVE
LYMPHOCYTES: 47.1 %
MCH: 26.7 PG (ref 27–33)
MCHC: 32.6 G/DL (ref 32–36)
MCV: 82 FL (ref 80–100)
MONOCYTES: 9.6 %
MPV: 9.3 FL (ref 7.5–12.5)
NEUTROPHILS: 38.9 %
NITRITE: NEGATIVE
NON-HDL CHOLESTEROL: 139 MG/DL (CALC)
OCCULT BLOOD: NEGATIVE
PH: 6 (ref 5–8)
PLATELET COUNT: 462 THOUSAND/UL (ref 140–400)
POTASSIUM: 4.7 MMOL/L (ref 3.5–5.3)
PROTEIN, TOTAL: 7.8 G/DL (ref 6.1–8.1)
PROTEIN: NEGATIVE
RDW: 15 % (ref 11–15)
RED BLOOD CELL COUNT: 5.5 MILLION/UL (ref 4.2–5.8)
SODIUM: 138 MMOL/L (ref 135–146)
SPECIFIC GRAVITY: 1.02 (ref 1–1.03)
TRIGLYCERIDES: 80 MG/DL
TSH: 2.36 MIU/L (ref 0.4–4.5)
VITAMIN B12: 379 PG/ML (ref 200–1100)
VITAMIN D, 25-OH, TOTAL: 22 NG/ML (ref 30–100)
WHITE BLOOD CELL COUNT: 5.5 THOUSAND/UL (ref 3.8–10.8)

## 2020-02-17 RX ORDER — MONTELUKAST SODIUM 10 MG/1
10 TABLET ORAL NIGHTLY
Qty: 30 TABLET | Refills: 3 | Status: SHIPPED | OUTPATIENT
Start: 2020-02-17 | End: 2020-03-20

## 2020-02-17 NOTE — TELEPHONE ENCOUNTER
Informed patient of note below and Rx sent for Montelukast and called for Claritin D,advised to check if medication is ready,pt verbalized understanding.

## 2020-02-20 ENCOUNTER — HOSPITAL ENCOUNTER (OUTPATIENT)
Dept: CV DIAGNOSTICS | Facility: HOSPITAL | Age: 50
Discharge: HOME | End: 2020-02-20
Attending: INTERNAL MEDICINE
Payer: COMMERCIAL

## 2020-02-20 DIAGNOSIS — E66.01 MORBID OBESITY (HCC): ICD-10-CM

## 2020-02-20 DIAGNOSIS — I48.0 PAROXYSMAL ATRIAL FIBRILLATION (HCC): ICD-10-CM

## 2020-02-20 PROCEDURE — 93306 TTE W/DOPPLER COMPLETE: CPT | Performed by: INTERNAL MEDICINE

## 2020-02-28 ENCOUNTER — OFFICE VISIT (OUTPATIENT)
Dept: OTOLARYNGOLOGY | Facility: CLINIC | Age: 50
End: 2020-02-28
Payer: COMMERCIAL

## 2020-02-28 VITALS — DIASTOLIC BLOOD PRESSURE: 86 MMHG | SYSTOLIC BLOOD PRESSURE: 138 MMHG | HEART RATE: 76 BPM | TEMPERATURE: 98 F

## 2020-02-28 DIAGNOSIS — J01.90 ACUTE NON-RECURRENT SINUSITIS, UNSPECIFIED LOCATION: Primary | ICD-10-CM

## 2020-02-28 PROCEDURE — 99213 OFFICE O/P EST LOW 20 MIN: CPT | Performed by: OTOLARYNGOLOGY

## 2020-02-28 RX ORDER — AMOXICILLIN AND CLAVULANATE POTASSIUM 875; 125 MG/1; MG/1
1 TABLET, FILM COATED ORAL EVERY 12 HOURS
Qty: 20 TABLET | Refills: 0 | Status: SHIPPED | OUTPATIENT
Start: 2020-02-28 | End: 2020-03-20

## 2020-02-28 RX ORDER — AZELASTINE 1 MG/ML
2 SPRAY, METERED NASAL 2 TIMES DAILY
Qty: 1 BOTTLE | Refills: 0 | Status: SHIPPED | OUTPATIENT
Start: 2020-02-28 | End: 2020-03-18

## 2020-02-28 NOTE — PROGRESS NOTES
Bee Holloway is a 52year old male.   Patient presents with:  Cough: dry cough for 1 week, productive at night  Sinus Problem: had sinus surgery in November      HISTORY OF PRESENT ILLNESS  He presents with a recent CT scan performed in the last 2 w Currently using Singulair on a daily basis but not using any other medications or sprays. Breathing is overall improved despite acute sinus-like symptoms at this time. No tobacco use. Allergies? I do not suspect he has had any previous allergy testing. and weight loss. ENMT Negative Drooling. Eyes Negative Blurred vision and vision changes. Respiratory Negative Dyspnea and wheezing. Cardio Negative Chest pain, irregular heartbeat/palpitations and syncope.    GI Negative Abdominal pain and diarrhea Medications:   •  Azelastine HCl 0.1 % Nasal Solution, 2 sprays by Nasal route 2 (two) times daily. , Disp: 1 Bottle, Rfl: 0  •  Loratadine-Pseudoephedrine ER 5-120 MG Oral Tablet 12 Hr, Take 1 tablet by mouth every 12 (twelve) hours. , Disp: 60 tablet, Rfl:

## 2020-03-15 NOTE — TELEPHONE ENCOUNTER
Refill passed per [facility] protocol.   Refill Protocol Appointment Criteria  · Appointment scheduled in the past 6 months or in the next 3 months  Recent Outpatient Visits            2 weeks ago Acute non-recurrent sinusitis, unspecified location

## 2020-03-18 RX ORDER — AZELASTINE 1 MG/ML
SPRAY, METERED NASAL
Qty: 1 BOTTLE | Refills: 0 | Status: SHIPPED | OUTPATIENT
Start: 2020-03-18 | End: 2020-03-20

## 2020-03-20 ENCOUNTER — OFFICE VISIT (OUTPATIENT)
Dept: INTERNAL MEDICINE CLINIC | Facility: CLINIC | Age: 50
End: 2020-03-20
Payer: COMMERCIAL

## 2020-03-20 VITALS
BODY MASS INDEX: 42.66 KG/M2 | RESPIRATION RATE: 18 BRPM | SYSTOLIC BLOOD PRESSURE: 124 MMHG | HEART RATE: 76 BPM | HEIGHT: 72 IN | WEIGHT: 315 LBS | DIASTOLIC BLOOD PRESSURE: 86 MMHG

## 2020-03-20 DIAGNOSIS — M25.561 CHRONIC PAIN OF RIGHT KNEE: ICD-10-CM

## 2020-03-20 DIAGNOSIS — I27.20 PULMONARY HTN (HCC): ICD-10-CM

## 2020-03-20 DIAGNOSIS — J45.20 MILD INTERMITTENT ASTHMA WITHOUT COMPLICATION: ICD-10-CM

## 2020-03-20 DIAGNOSIS — G89.29 CHRONIC PAIN OF RIGHT KNEE: ICD-10-CM

## 2020-03-20 DIAGNOSIS — J30.9 ALLERGIC RHINITIS, UNSPECIFIED SEASONALITY, UNSPECIFIED TRIGGER: ICD-10-CM

## 2020-03-20 DIAGNOSIS — G47.33 OBSTRUCTIVE SLEEP APNEA SYNDROME: ICD-10-CM

## 2020-03-20 DIAGNOSIS — E55.9 VITAMIN D DEFICIENCY: Primary | ICD-10-CM

## 2020-03-20 PROCEDURE — 99214 OFFICE O/P EST MOD 30 MIN: CPT | Performed by: INTERNAL MEDICINE

## 2020-03-20 RX ORDER — FLUTICASONE PROPIONATE 50 MCG
SPRAY, SUSPENSION (ML) NASAL
Qty: 1 BOTTLE | Refills: 3 | Status: SHIPPED | OUTPATIENT
Start: 2020-03-20 | End: 2020-04-22

## 2020-03-20 NOTE — PROGRESS NOTES
HPI:    Patient ID: Sebastian Younger is a 52year old male. Written by Jorje Wallace MD on 2/13/2020 10:06 AM   The blood counts look normal-no anemia.    The kidney functions, liver functions, electrolytes and sugars look normal.   Cholesterol pane repair-stiffness and swelling left knee,some difficulty with gain and dull LBA. The quality of the pain is described as aching. The pain is at a severity of 5/10. The pain is moderate.  Associated symptoms include joint swelling, a limited range of motion a Mouth/Throat: Oropharynx is clear and moist. No oropharyngeal exudate. Eyes: Pupils are equal, round, and reactive to light. Conjunctivae and EOM are normal.   Neck: Normal range of motion. Neck supple. No JVD present. No thyromegaly present.    Cardiov surgery and decided to stop using the CPAP as he was having difficulty tolerating it. He has not been using it for about a year at this time. Is 2D echo Doppler of the heart shows elevation in the pulmonary pressures at 56.   Moderate pulmonary hypertensi pulmonary pressures. Consider repeat echocardiogram in about 6 months after restarting regular use of his CPAP. If this does not show any signs of improvement will need other cardiac work-up like a stress test and a Holter monitor to complete evaluation.

## 2020-03-20 NOTE — PATIENT INSTRUCTIONS
Problem List Items Addressed This Visit        Unprioritized    Allergic rhinitis     History of allergic rhinitis with septal hypertrophy. He had stopped using his Flonase after his nasal surgery.   On examination today he does have some slight enlargemen time.  Is 2D echo Doppler of the heart shows elevation in the pulmonary pressures at 56. Moderate pulmonary hypertension.   Patient not aware of this in the past and he does not remember having an echocardiogram done in the past.  He is being advised to st

## 2020-03-20 NOTE — ASSESSMENT & PLAN NOTE
Patient with a history of sleep apnea and was on the CPAP. He has undergone nasal turbinate surgery and decided to stop using the CPAP as he was having difficulty tolerating it. He has not been using it for about a year at this time.   Is 2D echo Doppler

## 2020-03-20 NOTE — ASSESSMENT & PLAN NOTE
History of allergic rhinitis with septal hypertrophy. He had stopped using his Flonase after his nasal surgery. On examination today he does have some slight enlargement in his turbinates bilaterally but no occlusion noted.   As we are to restart on his C

## 2020-03-20 NOTE — ASSESSMENT & PLAN NOTE
Newly diagnosed pulmonary hypertension– pulmonary pressures at 64. Patient without any symptoms at this time. He does have a history of obstructive sleep apnea which he has not been treating at this point.   He also has a history of morbid obesity–has man

## 2020-03-20 NOTE — ASSESSMENT & PLAN NOTE
History of mild intermittent asthma without any complication. He has been using albuterol inhaler on an as-needed basis. Usually use about once a week. He is on Singulair, levocetirizine 5 mg daily.   He has been advised to stop his loratadine–pseudoephe

## 2020-03-20 NOTE — ASSESSMENT & PLAN NOTE
Continued soreness in the left knee, has had a meniscal repair surgery in December 2019. He does have some restriction in the range of movements. He has been exercising it on a regular basis. He is on meloxicam at 15 mg daily.   He will be referred to or

## 2020-03-30 ENCOUNTER — TELEPHONE (OUTPATIENT)
Dept: OTOLARYNGOLOGY | Facility: CLINIC | Age: 50
End: 2020-03-30

## 2020-03-30 ENCOUNTER — NURSE TRIAGE (OUTPATIENT)
Dept: OTHER | Age: 50
End: 2020-03-30

## 2020-03-30 DIAGNOSIS — R05.9 COUGH: Primary | ICD-10-CM

## 2020-03-30 PROCEDURE — 99212 OFFICE O/P EST SF 10 MIN: CPT | Performed by: INTERNAL MEDICINE

## 2020-03-30 RX ORDER — AZITHROMYCIN 250 MG/1
TABLET, FILM COATED ORAL
Qty: 6 TABLET | Refills: 0 | Status: ON HOLD | OUTPATIENT
Start: 2020-03-30 | End: 2020-04-11

## 2020-03-30 RX ORDER — CODEINE PHOSPHATE AND GUAIFENESIN 10; 100 MG/5ML; MG/5ML
SOLUTION ORAL
Qty: 100 ML | Refills: 0 | Status: ON HOLD | OUTPATIENT
Start: 2020-03-30 | End: 2020-04-11

## 2020-03-30 NOTE — TELEPHONE ENCOUNTER
Action Requested: Summary for Provider     []  Critical Lab, Recommendations Needed  [x] Need Additional Advice  []   FYI    []   Need Orders  [x] Need Medications Sent to Pharmacy  []  Other     SUMMARY: Patient states, \"I have bronchitis, I have had i

## 2020-03-30 NOTE — TELEPHONE ENCOUNTER
Per pt he has bronchitis, asking for recommendations from Dr. Monique Reyes. Please advise thank you.

## 2020-03-30 NOTE — ASSESSMENT & PLAN NOTE
Persistent, dry hacking cough partially better with the inhaler. Low-grade temp on and off about 100. No body aches no chills. No significant headache or sinus issues.   Has had chronic allergies and hence has been on Zyrtec and Singulair through ENT i

## 2020-03-30 NOTE — TELEPHONE ENCOUNTER
Virtual/Telephone Check-In    Nadira Epstein verbally consents to a Virtual/Telephone Check-In service on 03/30/20.   Patient understands and accepts financial responsibility for any deductible, co-insurance and/or co-pays associated with this service

## 2020-04-01 ENCOUNTER — TELEPHONE (OUTPATIENT)
Dept: OTHER | Age: 50
End: 2020-04-01

## 2020-04-01 ENCOUNTER — APPOINTMENT (OUTPATIENT)
Dept: GENERAL RADIOLOGY | Facility: HOSPITAL | Age: 50
DRG: 177 | End: 2020-04-01
Attending: EMERGENCY MEDICINE
Payer: COMMERCIAL

## 2020-04-01 ENCOUNTER — HOSPITAL ENCOUNTER (INPATIENT)
Facility: HOSPITAL | Age: 50
LOS: 10 days | Discharge: HOME | DRG: 177 | End: 2020-04-11
Attending: EMERGENCY MEDICINE | Admitting: HOSPITALIST
Payer: COMMERCIAL

## 2020-04-01 DIAGNOSIS — J12.9 VIRAL PNEUMONIA: Primary | ICD-10-CM

## 2020-04-01 DIAGNOSIS — E66.01 MORBID OBESITY (HCC): ICD-10-CM

## 2020-04-01 DIAGNOSIS — I27.20 PULMONARY HTN (HCC): ICD-10-CM

## 2020-04-01 DIAGNOSIS — R09.02 HYPOXIA: ICD-10-CM

## 2020-04-01 PROCEDURE — 99223 1ST HOSP IP/OBS HIGH 75: CPT | Performed by: HOSPITALIST

## 2020-04-01 PROCEDURE — 99254 IP/OBS CNSLTJ NEW/EST MOD 60: CPT | Performed by: INTERNAL MEDICINE

## 2020-04-01 PROCEDURE — 71045 X-RAY EXAM CHEST 1 VIEW: CPT | Performed by: EMERGENCY MEDICINE

## 2020-04-01 RX ORDER — HYDROXYCHLOROQUINE SULFATE 200 MG/1
400 TABLET, FILM COATED ORAL 2 TIMES DAILY
Status: DISCONTINUED | OUTPATIENT
Start: 2020-04-01 | End: 2020-04-01

## 2020-04-01 RX ORDER — GUAIFENESIN 100 MG/5ML
200 SOLUTION ORAL EVERY 4 HOURS PRN
Status: DISCONTINUED | OUTPATIENT
Start: 2020-04-01 | End: 2020-04-11

## 2020-04-01 RX ORDER — ACETAMINOPHEN 500 MG
500 TABLET ORAL EVERY 8 HOURS PRN
Status: DISCONTINUED | OUTPATIENT
Start: 2020-04-01 | End: 2020-04-01

## 2020-04-01 RX ORDER — FLUTICASONE PROPIONATE 50 MCG
1 SPRAY, SUSPENSION (ML) NASAL 2 TIMES DAILY
Status: DISCONTINUED | OUTPATIENT
Start: 2020-04-01 | End: 2020-04-02

## 2020-04-01 RX ORDER — MONTELUKAST SODIUM 10 MG/1
10 TABLET ORAL NIGHTLY
Status: DISCONTINUED | OUTPATIENT
Start: 2020-04-01 | End: 2020-04-11

## 2020-04-01 RX ORDER — ACETAMINOPHEN 500 MG
500 TABLET ORAL EVERY 8 HOURS PRN
COMMUNITY
End: 2021-01-22

## 2020-04-01 RX ORDER — ONDANSETRON 2 MG/ML
4 INJECTION INTRAMUSCULAR; INTRAVENOUS EVERY 6 HOURS PRN
Status: DISCONTINUED | OUTPATIENT
Start: 2020-04-01 | End: 2020-04-11

## 2020-04-01 RX ORDER — BENZONATATE 100 MG/1
100 CAPSULE ORAL 3 TIMES DAILY PRN
Status: DISCONTINUED | OUTPATIENT
Start: 2020-04-01 | End: 2020-04-11

## 2020-04-01 RX ORDER — ENOXAPARIN SODIUM 100 MG/ML
40 INJECTION SUBCUTANEOUS DAILY
Status: DISCONTINUED | OUTPATIENT
Start: 2020-04-01 | End: 2020-04-01

## 2020-04-01 RX ORDER — ACETAMINOPHEN 325 MG/1
650 TABLET ORAL EVERY 6 HOURS PRN
Status: DISCONTINUED | OUTPATIENT
Start: 2020-04-01 | End: 2020-04-11

## 2020-04-01 RX ORDER — HYDROXYCHLOROQUINE SULFATE 200 MG/1
400 TABLET, FILM COATED ORAL 2 TIMES DAILY
Status: COMPLETED | OUTPATIENT
Start: 2020-04-01 | End: 2020-04-02

## 2020-04-01 RX ORDER — SODIUM CHLORIDE 0.9 % (FLUSH) 0.9 %
3 SYRINGE (ML) INJECTION AS NEEDED
Status: DISCONTINUED | OUTPATIENT
Start: 2020-04-01 | End: 2020-04-11

## 2020-04-01 RX ORDER — HYDROXYCHLOROQUINE SULFATE 200 MG/1
200 TABLET, FILM COATED ORAL 2 TIMES DAILY
Status: DISCONTINUED | OUTPATIENT
Start: 2020-04-02 | End: 2020-04-01

## 2020-04-01 RX ORDER — ALBUTEROL SULFATE 90 UG/1
1 AEROSOL, METERED RESPIRATORY (INHALATION) EVERY 4 HOURS PRN
Status: DISCONTINUED | OUTPATIENT
Start: 2020-04-01 | End: 2020-04-11

## 2020-04-01 RX ORDER — ACETAMINOPHEN 500 MG
1000 TABLET ORAL ONCE
Status: COMPLETED | OUTPATIENT
Start: 2020-04-01 | End: 2020-04-01

## 2020-04-01 RX ORDER — HYDROXYCHLOROQUINE SULFATE 200 MG/1
200 TABLET, FILM COATED ORAL 2 TIMES DAILY
Status: COMPLETED | OUTPATIENT
Start: 2020-04-02 | End: 2020-04-06

## 2020-04-01 RX ORDER — ENOXAPARIN SODIUM 100 MG/ML
30 INJECTION SUBCUTANEOUS EVERY 12 HOURS
Status: DISCONTINUED | OUTPATIENT
Start: 2020-04-01 | End: 2020-04-11

## 2020-04-01 NOTE — TELEPHONE ENCOUNTER
Patient was contacted on March 30 and started on a Z-Jersey he must have completed at least 2 doses but today.   If his temperatures that high with severe coughing, he will need to go to the emergency room for chest x-ray to rule out pneumonia and probable COV

## 2020-04-01 NOTE — TELEPHONE ENCOUNTER
Patient states that he is still having temperature of 101, body aches, pain in his ribs. Patient states his coughing is bothersome and he has shortness of breath at times. \"I don't know if this is bronchitis or COVID-19. \" Patient denies travel or sick co

## 2020-04-01 NOTE — TELEPHONE ENCOUNTER
Advised patient of Dr Hakeem Hobson  note. Patient verbalized understanding and had no further questions.  He stated he will go to [address] [place] [address].

## 2020-04-01 NOTE — PROGRESS NOTES
[facility] Pharmacy Note:  Antibiotic Dose Adjustment    Ceftriaxone (Rocephin) 1g IV once was ordered for [address]. Body mass index is 44.76 kg/m².   Wt Readings from Last 6 Encounters:  04/01/20 : (!) 149.7 kg (330 lb)  03/20/20 : (!) 152.4 kg (33 Spoke with patient and scheduled injections. Reviewed pre-op instructions. Patient verbalized understanding, no further questions at this time. Pre-op instructions sent via [employer].        [address]  PRE-PROCEDURE INSTRUCTIONS WITH IV LUX ? If you have an implanted Spinal Cord or Peripheral Nerve Stimulator: Please remember to turn device off for procedure      Medication:   Number of days you need to be off for the following medications:  ? Aggrenox 10 days   ?  Agrylin (Anagrelide) 10 days If you are a diabetic, please increase the frequency of your glucose monitoring after the procedure as this may cause a temporary increase in your blood sugar.   Contact your primary care physician if your blood sugar rises as you may require some medicatio

## 2020-04-01 NOTE — ED PROVIDER NOTES
Patient Seen in: [facility] AND [facility] Emergency Department      History   Patient presents with:  Cough/URI    Stated Complaint: cough/fever    HPI    51-year-old male with history of severe sleep apnea on CPAP at nighttime and resultant pulmonary hypertens above.    Physical Exam     ED Triage Vitals   BP 04/01/20 1603 128/77   Pulse 04/01/20 1603 99   Resp 04/01/20 1603 20   Temp 04/01/20 1603 98.8 °F (37.1 °C)   Temp src 04/01/20 1915 Oral   SpO2 04/01/20 1603 90 %   O2 Device 04/01/20 1603 None (Room air) DIFFERENTIAL[[id number]]                              Final result                 Please view results for these tests on the individual orders.    TROPONIN I   RAINBOW DRAW BLUE   RAINBOW DRAW LAVENDER   RAINBOW DRAW LIGHT GREEN   RAINBOW DRAW GOLD   SARS-C during my examination was cleaned with super sani-cloth germicidal wipes following the exam.  I avoided touching my face or mouth during the exam.  Admission disposition: 4/1/2020  6:51 PM                   Disposition and Plan     Clinical Impression:  Vi

## 2020-04-02 PROCEDURE — 99232 SBSQ HOSP IP/OBS MODERATE 35: CPT | Performed by: INTERNAL MEDICINE

## 2020-04-02 PROCEDURE — 99233 SBSQ HOSP IP/OBS HIGH 50: CPT | Performed by: HOSPITALIST

## 2020-04-02 RX ORDER — HYDROCODONE POLISTIREX AND CHLORPHENIRAMINE POLISTIREX 10; 8 MG/5ML; MG/5ML
5 SUSPENSION, EXTENDED RELEASE ORAL 2 TIMES DAILY
Status: DISCONTINUED | OUTPATIENT
Start: 2020-04-02 | End: 2020-04-11

## 2020-04-02 RX ORDER — AZELASTINE 1 MG/ML
SPRAY, METERED NASAL 2 TIMES DAILY
Status: DISCONTINUED | OUTPATIENT
Start: 2020-04-02 | End: 2020-04-11

## 2020-04-02 NOTE — CONSULTS
[facility]    Consult Note    Date:  4/1/2020  Date of Admission:  4/1/2020      Chief Complaint:   Toni Villareal is a(n) 52year old male with inability to stop coughing.     HPI:   The patient comes to the emergency room with a week Yes      Frequency: 2-4 times a month      Comment: 1-2x/month    Drug use: Not Currently      Types: Cannabis      Comment: last use - 2018    Allergies/Medications:    Allergies: No Known Allergies  acetaminophen 500 MG Oral Tab, Take 500 mg by mouth ever WBC 6.1 04/01/2020    HGB 13.7 04/01/2020    HCT 39.9 04/01/2020    .0 04/01/2020    CREATSERUM 1.15 04/01/2020    BUN 10 04/01/2020     04/01/2020    K 3.8 04/01/2020     04/01/2020    CO2 28.0 04/01/2020    GLU 93 04/01/2020    CA 8

## 2020-04-02 NOTE — PLAN OF CARE
Problem: Patient Centered Care  Goal: Patient preferences are identified and integrated in the patient's plan of care  Description  Interventions:  - What would you like us to know as we care for you? \"I am from home. \"  - Provide timely, complete, and broncho-pulmonary hygiene including cough, deep breathe, Incentive Spirometry  - Assess the need for suctioning and perform as needed  - Assess and instruct to report SOB or any respiratory difficulty  - Respiratory Therapy support as indicated  - Manage/a

## 2020-04-02 NOTE — PROGRESS NOTES
[facility] Pharmacy Note:  Adjustment of enoxaparin (Lovenox) dosing    Boni Oliveros was prescribed enoxaparin (Lovenox) 40 mg subcutaneously every 24 hours by Dr. Estela Perez. BMI: Body mass index is 44.76 kg/m².     Estimated Creatinine Clearance: 85.3 m

## 2020-04-02 NOTE — PLAN OF CARE
Problem: Patient Centered Care  Goal: Patient preferences are identified and integrated in the patient's plan of care  Description  Interventions:  - What would you like us to know as we care for you? \"I am from home. \"  - Provide timely, complete, and Smoking Cessation handout, if applicable  - Encourage broncho-pulmonary hygiene including cough, deep breathe, Incentive Spirometry  - Assess the need for suctioning and perform as needed  - Assess and instruct to report SOB or any respiratory difficulty

## 2020-04-02 NOTE — H&P
[facility]    PATIENT'S NAME: Vinicius Sandoval BRIAN DELGADILLO   ATTENDING PHYSICIAN: Garry Loyd MD   PATIENT ACCOUNT#:   [id number]    LOCATION:  [place] [address]  MEDICAL RECORD #:   [id number]       YOB: 1970  ADMISSION DATE:       04 PHYSICAL EXAMINATION:    GENERAL:  Alert. Oriented to time, place, and person. Moderate distress. VITAL SIGNS:  Temperature 98.8, pulse 89, respiratory rate 32, blood pressure 121/76. Pulse ox 88% on room air, 98% on 3L nasal cannula.    HEENT:  A

## 2020-04-02 NOTE — PAYOR COMM NOTE
--------------  ADMISSION REVIEW     Payor: ALEJANDRA [facility]  Subscriber #:  [id number]  Authorization Number: [id number]    Admit date: 4/1/20  Admit time: 2105       Admitting Physician: Art Giang MD  Attending Physician:  Jaya Hall MD  Primary C at [facility] MAIN OR   • OTHER SURGICAL HISTORY Left 12/18/2019    left meniscal repair   • REPAIR OF NASAL SEPTUM  11/25/2019                    Social History    Tobacco Use      Smoking status: Never Smoker      Smokeless tobacco: Never Used    Alcohol use:  Zaki Freitas pneumonia.       ED Course     Labs Reviewed   BASIC METABOLIC PANEL (8) - Abnormal; Notable for the following components:       Result Value    BUN/CREA Ratio 8.7 (*)     Calcium, Total 8.4 (*)     All other components within normal limits   PROCALCITONIN [place] hospitalist.  Coronavirus testing pending at this time. Patient will be kept in isolation. ID on c/s, got azithro before, give rocephin now. Pulm to see. COVID panel pending.           All measures to prevent infection transmission during my in Patient is a 51-year-old [facility] American male with history of morbid obesity and obstructive sleep apnea who started having upper respiratory tract infection associated with cough. Started on Z-Jersey recently without improvement in his symptoms.   Recently round, reactive. NECK:  Supple. No lymphadenopathy. Trachea midline. Full range of motion. LUNGS:  Bilateral rhonchi auscultated on both lung bases. HEART:  Regular rate, rhythm. S1, S2 auscultated. No murmur. ABDOMEN:  Soft, nondistended.   [place] [employer] [place] Askew RN    4/2/2020 0048 Given 100 mg Oral Bob Kaiser RN      cefTRIAXone Sodium (ROCEPHIN) 2 g in sodium chloride 0.9% 100 mL MBP/ADD-vantage     Date Action Dose Route User    4/1/2020 1915 New Bag 2 g Intravenous Katherin Raya RN There is no history of travel nor exposure to the coronavirus. He was desaturated on room air. Review of Systems:   Review of Systems:  Vision normal. Ear nose and throat normal. Bowel normal. Bladder function normal. No depression. No thyroid disease.  Jessica Kirby infected, so as to minimize aerosolization of particle. 4/2 ID CONSULT NOTE  Reason for Consultation:  RO COVID-19    Physical Exam:  Vital signs: Blood pressure 154/84, pulse 89, temperature 98.6 °F (37 °C), temperature source Oral, resp.  rate 20, heig that the patient has the novel coronavirus pneumonitis.  Will cover for bacterial pneumonia as well. Novel coronavirus assay is still pending. The patient was feeling about the same as when he was admitted yesterday.   Still coughing.     Recommendations:

## 2020-04-02 NOTE — ED NOTES
Pt requesting to take home medication guaifenesin with codeine 5mL prescribed by PCP. Dr. Gustavo Blanton notified and reports ok for patient to take home dose medication x1 in ER. Pt updated, taking 5mL. Pt aware this is a one time dose of home medication.

## 2020-04-02 NOTE — ED NOTES
Report given to [facility]. Pt ready to transport to room 421 on 3L nasal canula. VSS at time of ED departure. All pt belongings included in transport.

## 2020-04-02 NOTE — PLAN OF CARE
Pt came up to floor at 2100. He is alert and oriented x 3, on 3L sating at 93-95%. VS taken and recorded. He complained of productive cough, administered PRN cough medication. Med rec completed and orders relayed to Dr. Rema Mcnally.  Initiated on remote tele p

## 2020-04-02 NOTE — TELEPHONE ENCOUNTER
[address] Problem List        Morbid obesity [facility])      Pulmonary HTN ([facility] [address]       Viral pneumonia      Hypoxia

## 2020-04-02 NOTE — PROGRESS NOTES
[facility]    Progress Note      Assessment and Plan:   1. Dyspnea with cough–my strong suspicion is that the patient has the novel coronavirus pneumonitis. Will cover for bacterial pneumonia as well.   Novel coronavirus assay is still pen [facility]  Pager: [phone]

## 2020-04-02 NOTE — CONSULTS
[address] [place] [address] ID CONSULT NOTE    Jerrica Sapp Patient Status:  Inpatient    10/25/1970 MRN [id number]   Location [facility] 4W/SW/SE Attending Valeria Archer MD   Hosp Day # 1 PCP Magalie Pakrs MD       Reason for Hypertension Brother    • Hypertension Father    • Other (Diverticulitis) Father       reports that he has never smoked. He has never used smokeless tobacco. He reports current alcohol use. He reports previous drug use. Drug: Cannabis.     Allergies:  No Kn No abdominal pain or blood. GENITOURINARY:  No Burning on urination. NEUROLOGICAL:  No headache, dizziness, syncope, paralysis, ataxia, numbness or tingling in the extremities. MUSCULOSKELETAL:  No muscle, back pain, joint pain or stiffness.   Nunu Lim fever curve, wbc. -  Reviewed labs, micro, imaging reports, available old records. -  Case d/w patient on phone, RN. Thank you for allowing us to participate in the care of this patient. Please do not hesitate to call if you have any questions.    Ken ruiz

## 2020-04-02 NOTE — PROGRESS NOTES
[facility] Pharmacy Note: Antimicrobial Weight Based Dose Adjustment for: ceftriaxone (ROCEPHIN)    Annette Nash is a 52year old male who has been prescribed ceftriaxone (ROCEPHIN) 1g every 24 hours.     Estimated Creatinine Clearance: 85.3 mL/min (based

## 2020-04-02 NOTE — PROGRESS NOTES
[place] [facility]    Progress Note    Dick Jackson Patient Status:  Inpatient    10/25/1970 MRN [id number]   Location [facility] 4W/SW/SE Attending Merna Steve,  [facility] Day # 1 PCP Bertha Hughes MD       Subjective:     Fee virus    Hx of morbid obesity  Body mass index is 45.28 kg/m².      Hx of brief a-fib    dvt proph:   lovenox    Code status:   Full    >35 minutes spent     Shiv Mcpherson MD  4/2/2020

## 2020-04-03 PROCEDURE — 99233 SBSQ HOSP IP/OBS HIGH 50: CPT | Performed by: HOSPITALIST

## 2020-04-03 PROCEDURE — 99232 SBSQ HOSP IP/OBS MODERATE 35: CPT | Performed by: INTERNAL MEDICINE

## 2020-04-03 RX ORDER — CHOLECALCIFEROL (VITAMIN D3) 125 MCG
1 CAPSULE ORAL DAILY
COMMUNITY

## 2020-04-03 NOTE — PROGRESS NOTES
[facility]    Progress Note    Nadira Epstein Patient Status:  Inpatient    10/25/1970 MRN [id number]   Location [facility] 4W/SW/SE Attending Jolene Mccall MD   Hosp Day # 2 PCP Remedios Burns MD       Subjective:     C/o

## 2020-04-03 NOTE — PROGRESS NOTES
[facility]    Progress Note    Reg Patrick Patient Status:  Inpatient    10/25/1970 MRN [id number]   Location [facility] 4W/SW/SE Attending Candance Lewis, MD   Hosp Day # 2 PCP Dayana Peterson MD        Subjective:    Parker signed on 04/01/2020 at 16:59 by MD Malik Tran MD  4/3/2020

## 2020-04-03 NOTE — PLAN OF CARE
Problem: Risk for Infection  Goal: Absence of fever/infection during anticipated neutropenic period  Description  INTERVENTIONS:  - Monitor WBC  - Implement neutropenic guidelines  - Maintain Contact and Droplet precaution for (+) Covid.   Outcome: Progre

## 2020-04-03 NOTE — PLAN OF CARE
Patient alert and oriented. Tylenol given for elevated temps overnight. 5L O2 in place. Independently moving throughout the room. Saline locked. Tussinox and Tessalon for cough.    Problem: Patient Centered Care  Goal: Patient preferences are identified and for changes in mentation and behavior  - Position to facilitate oxygenation and minimize respiratory effort  - Oxygen supplementation based on oxygen saturation or ABGs  - Provide Smoking Cessation handout, if applicable  - Encourage broncho-pulmonary hygi

## 2020-04-03 NOTE — PROGRESS NOTES
[place] [facility]    Pebbles Angulo Patient Status:  Inpatient    10/25/1970 MRN [id number]   Location [facility] 4W/SW/SE Attending Alicia Maguire MD   Hosp Day # 2 PCP Clint Reeves MD     Subjective: Had some loose stools yesterday. Complaining of rib pain from coughing. Coughing up clear/brown sputum. On arrival, Tmax 102.5, wbc 6.1, CXR with patchy bibasilar pneumonia, PCT 0.11, COVID-19 PCR sent and pending. Currently on 2L NC.  Started on azithromyc

## 2020-04-04 PROCEDURE — 99233 SBSQ HOSP IP/OBS HIGH 50: CPT | Performed by: HOSPITALIST

## 2020-04-04 PROCEDURE — 99232 SBSQ HOSP IP/OBS MODERATE 35: CPT | Performed by: INTERNAL MEDICINE

## 2020-04-04 NOTE — PROGRESS NOTES
[place] [facility]    Progress Note    Jessica Crane Patient Status:  Inpatient    10/25/1970 MRN [id number]   Location [facility] 4W/SW/SE Attending Pat Pichardo,  [facility]  Day # 3 PCP Jourdan Sullivan MD       Subjective:     Fee

## 2020-04-04 NOTE — PLAN OF CARE
Lanette Santoyo is aware of POC at this time. He is requiring 6L highflow O2 at this time. Tylenol given PRN for c/o chest soreness with reliefe. Pt has productive cough with thick large mucous secretions.     He is ambulating in the room and spent most of the day oxygenation  Description  INTERVENTIONS:  - Assess for changes in respiratory status  - Assess for changes in mentation and behavior  - Position to facilitate oxygenation and minimize respiratory effort  - Oxygen supplementation based on oxygen saturation

## 2020-04-04 NOTE — PLAN OF CARE
Patient A&Ox4. Up independently in room. General diet, tolerating well. On 5L O2. Patient reports productive cough, Robitussin given PRN. Pain controlled with PRN tylenol. Tele in place. Voiding well. Continuing IV antibiotics. Q2 vitals performed.  Kyle Cha Achieves optimal ventilation and oxygenation  Description  INTERVENTIONS:  - Assess for changes in respiratory status  - Assess for changes in mentation and behavior  - Position to facilitate oxygenation and minimize respiratory effort  - Oxygen supplement

## 2020-04-04 NOTE — PROGRESS NOTES
[facility] - [facility]    Progress Note      Assessment and Plan:   1. Novel coronavirus pneumonia– stabilizing clinically. The patient has completed 3 days of azithromycin. Remains on Plaquenil. Phlegm production.     Recommendations:  1.   Cailin Peng

## 2020-04-05 PROCEDURE — 99232 SBSQ HOSP IP/OBS MODERATE 35: CPT | Performed by: INTERNAL MEDICINE

## 2020-04-05 PROCEDURE — 99231 SBSQ HOSP IP/OBS SF/LOW 25: CPT | Performed by: HOSPITALIST

## 2020-04-05 RX ORDER — ASCORBIC ACID 500 MG
500 TABLET ORAL 2 TIMES DAILY
Status: DISCONTINUED | OUTPATIENT
Start: 2020-04-05 | End: 2020-04-11

## 2020-04-05 RX ORDER — ZINC SULFATE 50(220)MG
220 CAPSULE ORAL DAILY
Status: DISCONTINUED | OUTPATIENT
Start: 2020-04-05 | End: 2020-04-11

## 2020-04-05 NOTE — PROGRESS NOTES
[facility]    Progress Note    Boni Marking Patient Status:  Inpatient    10/25/1970 MRN [id number]   Location [facility] 4W/SW/SE Attending Trinidad Johnson MD   Hosp Day # 4 PCP Dann Kemp MD        Subjective:

## 2020-04-05 NOTE — PROGRESS NOTES
[place] [facility] - [facility]    Progress Note    [facility] Patient Status:  Inpatient    10/25/1970 MRN [id number]   Location [facility] 4W/SW/SE Attending Enoch Carrera,  [facility] Day # 4 PCP Shana Gamble MD       Subjective:     Fee

## 2020-04-05 NOTE — PLAN OF CARE
Patient A&Ox4. Up independently in room. General diet, tolerating well. On 6L O2. Patient reports productive cough, Robitussin given PRN. Pain controlled with PRN tylenol. Tele in place. Voiding well. Q2 vitals performed. Isolation precautions maintained. and oxygenation  Description  INTERVENTIONS:  - Assess for changes in respiratory status  - Assess for changes in mentation and behavior  - Position to facilitate oxygenation and minimize respiratory effort  - Oxygen supplementation based on oxygen saturat

## 2020-04-05 NOTE — PLAN OF CARE
Kassi Welsh is aware of POC at this time. He is complaining of being more tired today. He is requiring 6-7L highflow O2 at this time. Tylenol given PRN for c/o chest soreness with resolution. Pt has productive cough with thick large mucous secretions.      He RESPIRATORY - ADULT  Goal: Achieves optimal ventilation and oxygenation  Description  INTERVENTIONS:  - Assess for changes in respiratory status  - Assess for changes in mentation and behavior  - Position to facilitate oxygenation and minimize respiratory

## 2020-04-06 PROCEDURE — 99232 SBSQ HOSP IP/OBS MODERATE 35: CPT | Performed by: INTERNAL MEDICINE

## 2020-04-06 PROCEDURE — 99233 SBSQ HOSP IP/OBS HIGH 50: CPT | Performed by: HOSPITALIST

## 2020-04-06 NOTE — PROGRESS NOTES
[facility] - [facility]    Progress Note      Assessment and Plan:   1. Novel coronavirus pneumonia– the patient completed a azithromycin and Plaquenil. He remains on 8 L oxygen and will taper. .     Recommendations:  1.   Empiric antibiotic–completed

## 2020-04-06 NOTE — PLAN OF CARE
Patient A&Ox4. Up independently in room. General diet, tolerating well. On 7L O2. Patient continues to have productive cough. Robitussin and Tessalon given PRN. Pain controlled with PRN tylenol. Tele in place. Voiding well. Q2 vitals performed.  Isolation Achieves optimal ventilation and oxygenation  Description  INTERVENTIONS:  - Assess for changes in respiratory status  - Assess for changes in mentation and behavior  - Position to facilitate oxygenation and minimize respiratory effort  - Oxygen supplement

## 2020-04-06 NOTE — PAYOR COMM NOTE
--------------  CONTINUED STAY REVIEW    Payor: ALEJANDRA VIEIRA  Subscriber #:  [id number]  Authorization Number: [id number]  - FAXING CLINICAL UPDATE FOR 4/4/20- 4/6/20      Admit date: 4/1/20  Admit time: 2105 4/4/20  Subjective:      Feeling better.   Walk ~6L O2.  - wean o2 as tolerated  - pt to mobilize in room  - pulm on consult  - ID was consulted  - cont plaquenil  - cont azithromycin  - tylenol and proair prn  - pt instructed on prone positioning  - incentive spirometer  - vitamin C bid, zinc daily    Component Value Date     WBC 7.5 04/06/2020     HGB 12.9 04/06/2020     HCT 37.8 04/06/2020     .0 04/06/2020     CREATSERUM 0.92 04/06/2020     BUN 8 04/06/2020      04/06/2020     K 4.0 04/06/2020      04/06/2020     CO2 28.0 04/06/2 Route     4/5/2020 1957 Given 10 mg Oral       Vitamin C tab 500 mg     Date Action Dose Route     4/6/2020 0944 Given Other 500 mg Oral     4/5/2020 1607 Given 500 mg Oral       zinc sulfate (ZINCATE) cap 220 mg     Date Action Dose Route     4/6/2020 131

## 2020-04-06 NOTE — PROGRESS NOTES
[place] [facility] - [facility]    Progress Note    Phan Meneses Patient Status:  Inpatient    10/25/1970 MRN [id number]   Location [facility] 4W/SW/SE Attending Pedro Carter,  [facility] Se Day # 5 PCP Lenin Fox MD       Subjective:     Fee

## 2020-04-07 PROCEDURE — 99232 SBSQ HOSP IP/OBS MODERATE 35: CPT | Performed by: HOSPITALIST

## 2020-04-07 PROCEDURE — 99232 SBSQ HOSP IP/OBS MODERATE 35: CPT | Performed by: INTERNAL MEDICINE

## 2020-04-07 NOTE — PROGRESS NOTES
[place] [facility] - [facility]    Progress Note    Bee Holloway Patient Status:  Inpatient    10/25/1970 MRN [id number]   Location [facility] 4W/SW/SE Attending Andry Meneses, [address] [facility] Se Day # 6 PCP Kimmy Wolff MD       Subjective:     Fee

## 2020-04-07 NOTE — PLAN OF CARE
Patient remains mobile within room, ambulating self. VOiding freely. Remains at 5-6 Liters O2, desaturates with attempts to wean. Patient coughing frequently, Tessalon Pearls, Tylenol for reported headache.    Problem: Patient Centered Care  Goal: Patient p respiratory status  - Assess for changes in mentation and behavior  - Position to facilitate oxygenation and minimize respiratory effort  - Oxygen supplementation based on oxygen saturation or ABGs  - Provide Smoking Cessation handout, if applicable  - Enc

## 2020-04-07 NOTE — PLAN OF CARE
Patient alert and oriented. Has been on 8L HFNC this shift. Pt reports feeling \"lethargic/weak. \" Pt has cough - PRN Robitussin given. PRN Tylenol for mild pain. Tele in place #68 - no calls. SCD's and Lovenox. Pt voiding. Up in room independently.  Pt has Achieves optimal ventilation and oxygenation  Description  INTERVENTIONS:  - Assess for changes in respiratory status  - Assess for changes in mentation and behavior  - Position to facilitate oxygenation and minimize respiratory effort  - Oxygen supplement

## 2020-04-07 NOTE — PROGRESS NOTES
[facility] - [facility]    Progress Note      Assessment and Plan:   1. Novel coronavirus pneumonia– the patient completed a azithromycin and Plaquenil. He remains on 8 L oxygen and will taper. He appears very comfortable. Cough is improving.   The

## 2020-04-08 ENCOUNTER — APPOINTMENT (OUTPATIENT)
Dept: GENERAL RADIOLOGY | Facility: HOSPITAL | Age: 50
DRG: 177 | End: 2020-04-08
Attending: INTERNAL MEDICINE
Payer: COMMERCIAL

## 2020-04-08 PROCEDURE — 99233 SBSQ HOSP IP/OBS HIGH 50: CPT | Performed by: HOSPITALIST

## 2020-04-08 PROCEDURE — 71045 X-RAY EXAM CHEST 1 VIEW: CPT | Performed by: INTERNAL MEDICINE

## 2020-04-08 PROCEDURE — 99232 SBSQ HOSP IP/OBS MODERATE 35: CPT | Performed by: INTERNAL MEDICINE

## 2020-04-08 NOTE — PROGRESS NOTES
[facility]  Progress Note     Phan Meneses  : 10/25/1970    Status: Inpatient  Day #: 7    Attending: Zoraida Cornelius MD  PCP: Lenin Fox MD      Assessment and Plan     COVID-19 pneumonia and viral syndrome  -Now on ~5L O2 (improve 9.0    135* 137 135*   K 3.8 4.2 4.2 4.0    103 104 101   CO2 28.0 25.0 28.0 28.0   GLU 93 104* 94 98   TROP <0.045  --   --   --        Xr Chest Ap Portable  (cpt=71045)    Result Date: 4/8/2020  CONCLUSION: Diffuse patchy bilateral lung opaci

## 2020-04-08 NOTE — PLAN OF CARE
Problem: Pre-existing medical condition  Goal: Maintenance of stable medical condition  Description  INTERVENTIONS:  - Administer medications and treatments as ordered  - Monitor for and notify MD/LIP of changes as needed  Outcome: Progressing     Proble

## 2020-04-08 NOTE — PLAN OF CARE
Patient alert and oriented. On 5.5 L - maintaining O2 saturation. Pt does have productive cough - taking PRN Robitussin and Tessalon. Tele in place #68 - no calls. Lovenox and SCD's. Voiding. Low grade fever overnight but is controlled with PRN Tylenol.  Se Problem: RESPIRATORY - ADULT  Goal: Achieves optimal ventilation and oxygenation  Description  INTERVENTIONS:  - Assess for changes in respiratory status  - Assess for changes in mentation and behavior  - Position to facilitate oxygenation and minimize r

## 2020-04-08 NOTE — PAYOR COMM NOTE
--------------  CONTINUED STAY REVIEW    Payor: [employer]IEIRA  Subscriber #:  [id number]  Authorization Number: [id number]  - FAXING CLINICAL UPDATE FOR 4/7/20    Admit date: 4/1/20  Admit time: 2105 4/7/20  Assessment and Plan:   1.    Alma Cotto Sulfate  (90 Base) MCG/ACT inhaler 1 puff     Date Action Dose Route     4/7/2020 1700 Given 1 puff Inhalation     4/7/2020 0940 Given 1 puff Inhalation       Pt's OWN: Azelastine HCl (ASTELIN) 0.1 % nasal spray     Date Action Dose Route     4/8/20

## 2020-04-08 NOTE — DIETARY NOTE
Brief Nutrition Note:    Chart reviewed due to 7 day LOS. Pt is COVID+. Meals are not documented therefore contacted pt by phone. Pt sounded well. Reports he ate 100% of his lunch today and per recall it was a large meal--tolerated well. ...breakfast was

## 2020-04-08 NOTE — PROGRESS NOTES
[facility]    Progress Note    Tracie Childs Patient Status:  Inpatient    10/25/1970 MRN [id number]   Location [facility] 4W/SW/SE Attending Bessy Armenta MD   [facility] Day # 7 PCP Onofre Rodrigez MD        Subjective:     Mir Sensor study concerning for multifocal pneumonia. Dictated by (CST): Yolanda Deleon MD on 4/08/2020 at 8:16 AM     Finalized by (CST): Yolanda Deleon MD on 4/08/2020 at 8:17 AM                       Zara Somers.  Franny Ram MD  4/8/2020

## 2020-04-09 PROCEDURE — 99232 SBSQ HOSP IP/OBS MODERATE 35: CPT | Performed by: INTERNAL MEDICINE

## 2020-04-09 PROCEDURE — 99233 SBSQ HOSP IP/OBS HIGH 50: CPT | Performed by: HOSPITALIST

## 2020-04-09 NOTE — PAYOR COMM NOTE
--------------  CONTINUED STAY REVIEW    Payor: [employer]IEIR[employer]  Subscriber #:  [id number]  Authorization Number: [id number]    Admit date: 4/1/20  Admit time: 2105 4/9/20   Subjective:     Cough / occasional white sputum , no more fever and overall feels be Date Action Dose Route     4/9/2020 0951 Given 1 puff Inhalation       Pt's OWN: Azelastine HCl (ASTELIN) 0.1 % nasal spray     Date Action Dose Route     4/9/2020 0953 Given (none) Nasal (Bilateral Nares)     4/8/2020 2043 Given (none) Nasal (Bilateral

## 2020-04-09 NOTE — PROGRESS NOTES
[facility]    Progress Note    Jacque Bearden Patient Status:  Inpatient    10/25/1970 MRN [id number]   Location [facility] 4W/SW/SE Attending Wallis Lombard, MD   [facility] Day # 8 PCP Ilene Simms MD        Subjective:   Antonette Rizo Dictated by (CST): Lauren Samano MD on 4/08/2020 at 8:16 AM     Finalized by (CST): Lauren Samano MD on 4/08/2020 at 8:17 AM                       Janyce Krabbe.  West Clark MD  4/9/2020

## 2020-04-09 NOTE — PLAN OF CARE
Patient weaning from 5.5 L02, currently at 3.5 LO2. Tele monitoring in place, NSR. Voiding freely within the room. BM yesterday as reported by patient. Fevers overnight into AM, Tylenol given as ordered.  Low appetite remains, encouraged small snacks, rest and oxygenation  Description  INTERVENTIONS:  - Assess for changes in respiratory status  - Assess for changes in mentation and behavior  - Position to facilitate oxygenation and minimize respiratory effort  - Oxygen supplementation based on oxygen saturat

## 2020-04-09 NOTE — PROGRESS NOTES
[facility]  [facility] Note     Claudeen Mutton  : 10/25/1970    Status: Inpatient  Day #: 8    Attending: Danita Avendaño MD  PCP: Whit Bartlett MD      Assessment and Plan     COVID-19 pneumonia and viral syndrome  -improved  -Now on ~5L (CST): Ron Medellin MD on 4/08/2020 at 8:16 AM     Finalized by (CST): Ron Medellin MD on 4/08/2020 at 8:17 AM               Medications     • Vitamin C  500 mg Oral BID   • zinc sulfate  220 mg Oral Daily   • cholecalciferol  2,000 Units Oral Da

## 2020-04-10 PROCEDURE — 99232 SBSQ HOSP IP/OBS MODERATE 35: CPT | Performed by: INTERNAL MEDICINE

## 2020-04-10 PROCEDURE — 99233 SBSQ HOSP IP/OBS HIGH 50: CPT | Performed by: HOSPITALIST

## 2020-04-10 NOTE — CM/SW NOTE
CM spoke with JONO Francisco from [place],  She is requesting updates which I deferred to UR team and wanted to offer assistance in DC planning. Per Lulú Francisco home O2 is being covered at this time if pt needs it on dc. Lulú Francisco: Fred [id number]  Yessi Schwartz RN, [facility]

## 2020-04-10 NOTE — PROGRESS NOTES
[facility]OSP - [facility]  Progress Note     Donnie Jose L  : 10/25/1970    Status: Inpatient  Day #: 9    Attending: Tamar Ybarra MD  PCP: Jen Youssef MD      Assessment and Plan     COVID-19 pneumonia and viral syndrome  -improved  -decreased O • Azelastine HCl   Nasal BID   • enoxaparin  30 mg Subcutaneous Q12H   • Montelukast Sodium  10 mg Oral Nightly      PRN Meds: Normal Saline Flush, acetaminophen, ondansetron HCl, guaiFENesin, benzonatate, Albuterol Sulfate HFA      Spent >35 minutes, >5

## 2020-04-10 NOTE — PAYOR COMM NOTE
--------------  CONTINUED STAY REVIEW    Payor: [facility] P[employer]  Subscriber #:  [id number]  Authorization Number: [id number]    Admit date: 4/1/20  Admit time: 2105    Admitting Physician: Art Giang MD  Attending Physician:  Patricio Ruiz MD  Primary Care P zinc sulfate (ZINCATE) cap 220 mg     Date Action Dose Route User    4/10/2020 0905 Given 220 mg Oral Alena Tan RN            Plan: 4/10  Mariana Marc MD   Physician   Hospitalist   Progress Notes   Signed   Date of Service:  4/10/2020 11:24 AM GFRAA 113   GFRNAA 97   CA 9.0   *   K 4.0      CO2 28.0   GLU 98                  Medications      • Vitamin C  500 mg Oral BID   • zinc sulfate  220 mg Oral Daily   • cholecalciferol  2,000 Units Oral Daily   • Hydrocod Polst-CPM Polst ER  5

## 2020-04-10 NOTE — PROGRESS NOTES
[facility]    Progress Note    Ellie Sherwood Patient Status:  Inpatient    10/25/1970 MRN [id number]   Location [facility] 4W/SW/SE Attending Kiarra Gallardo MD   [facility] Day # 9 PCP Candelaria Whitaker MD        Subjective:     Toni Douglass

## 2020-04-11 VITALS
OXYGEN SATURATION: 95 % | RESPIRATION RATE: 20 BRPM | WEIGHT: 315 LBS | SYSTOLIC BLOOD PRESSURE: 126 MMHG | BODY MASS INDEX: 42.66 KG/M2 | TEMPERATURE: 98 F | HEIGHT: 72 IN | DIASTOLIC BLOOD PRESSURE: 83 MMHG | HEART RATE: 89 BPM

## 2020-04-11 PROCEDURE — 99239 HOSP IP/OBS DSCHRG MGMT >30: CPT | Performed by: HOSPITALIST

## 2020-04-11 RX ORDER — CODEINE PHOSPHATE AND GUAIFENESIN 10; 100 MG/5ML; MG/5ML
5 SOLUTION ORAL EVERY 6 HOURS PRN
Qty: 180 ML | Refills: 0 | Status: SHIPPED | OUTPATIENT
Start: 2020-04-11 | End: 2020-04-22 | Stop reason: ALTCHOICE

## 2020-04-11 RX ORDER — ALBUTEROL SULFATE 90 UG/1
2 AEROSOL, METERED RESPIRATORY (INHALATION) EVERY 4 HOURS PRN
Qty: 1 INHALER | Refills: 0 | Status: SHIPPED | OUTPATIENT
Start: 2020-04-11 | End: 2020-04-20

## 2020-04-11 RX ORDER — ZINC SULFATE 50(220)MG
220 CAPSULE ORAL DAILY
Qty: 30 CAPSULE | Refills: 0 | Status: SHIPPED | OUTPATIENT
Start: 2020-04-12 | End: 2020-05-18

## 2020-04-11 RX ORDER — ACETAMINOPHEN 325 MG/1
650 TABLET ORAL EVERY 6 HOURS PRN
Qty: 30 TABLET | Refills: 0 | Status: SHIPPED | OUTPATIENT
Start: 2020-04-11 | End: 2020-04-22 | Stop reason: ALTCHOICE

## 2020-04-11 RX ORDER — ASCORBIC ACID 500 MG
500 TABLET ORAL 2 TIMES DAILY
Qty: 60 TABLET | Refills: 0 | Status: SHIPPED | OUTPATIENT
Start: 2020-04-11 | End: 2020-07-03

## 2020-04-11 NOTE — PLAN OF CARE
Pt alert and oriented x4. VSS. On room air. No shortness of breath. Tolerating diet, no nausea. Voiding WNL. Ambulating in room. Tylenol for pain control. Cleared for discharge. Education given. Instructions given.    Problem: Patient Centered Care  Goal: P oxygenation  Description  INTERVENTIONS:  - Assess for changes in respiratory status  - Assess for changes in mentation and behavior  - Position to facilitate oxygenation and minimize respiratory effort  - Oxygen supplementation based on oxygen saturation

## 2020-04-11 NOTE — PROGRESS NOTES
[facility]  Progress Note     Taylor Kemp  : 10/25/1970    Status: Inpatient  Day #: 10    Attending: Presley Frey MD  PCP: Dewey Jhaveri MD      Assessment and Plan     COVID-19 pneumonia and viral syndrome  -improved  -decreased Azelastine HCl   Nasal BID   • enoxaparin  30 mg Subcutaneous Q12H   • Montelukast Sodium  10 mg Oral Nightly      PRN Meds: Normal Saline Flush, acetaminophen, ondansetron HCl, guaiFENesin, benzonatate, Albuterol Sulfate HFA      Spent >35 minutes, >50% o

## 2020-04-11 NOTE — DISCHARGE SUMMARY
[facility]  Discharge Summary     Roni Velazquez  : 10/25/1970    Status: Inpatient  Day #: 10    Attending: Judy Farris MD  PCP: Phoebe Blackburn MD     Date of Admission: 2020  Date of Discharge: 2020     Hospital Discharge D Clear to auscultation bilaterally, respirations unlabored  Gastrointestinal:  Soft, non-tender, normal bowel sounds  Musculoskeletal:  No joint swelling  Extremities:  No edema, no cyanosis, no clubbing  Neurologic:  nonfocal  Psychiatric:  Normal affect, tablet  Refills:  3     Montelukast Sodium 10 MG Tabs  Commonly known as:  SINGULAIR      TAKE 1 TABLET BY MOUTH AT BEDTIME   Quantity:  30 tablet  Refills:  3     Vitamin D3 50 MCG (2000 UT) Tabs      Take 1 tablet by mouth daily.    Refills:  0        STO

## 2020-04-11 NOTE — PLAN OF CARE
Problem: Patient Centered Care  Goal: Patient preferences are identified and integrated in the patient's plan of care  Description  Interventions:  - What would you like us to know as we care for you? \"I am from home. \"  - Provide timely, complete, and sputum in cup, clear/white. Patient is on 2L O2 via NC. PRN Robitussin administered throughout the night. PRN Tylenol administered. At the beginning of shift, patient stated some chest pain due to cough. Throughout the shift, patient stated no pain.  Ta

## 2020-04-13 ENCOUNTER — PATIENT OUTREACH (OUTPATIENT)
Dept: CASE MANAGEMENT | Age: 50
End: 2020-04-13

## 2020-04-13 DIAGNOSIS — Z02.9 ENCOUNTERS FOR ADMINISTRATIVE PURPOSE: ICD-10-CM

## 2020-04-13 DIAGNOSIS — J12.9 VIRAL PNEUMONIA: ICD-10-CM

## 2020-04-13 PROCEDURE — 1111F DSCHRG MED/CURRENT MED MERGE: CPT

## 2020-04-13 RX ORDER — AZELASTINE 1 MG/ML
SPRAY, METERED NASAL
Qty: 1 BOTTLE | Refills: 0 | Status: SHIPPED | OUTPATIENT
Start: 2020-04-13 | End: 2020-04-22

## 2020-04-13 NOTE — PROGRESS NOTES
Initial Post Discharge Follow Up   Discharge Date: 4/11/20  Contact Date: 4/13/2020    Consent Verification:  Assessment Completed With: Patient  HIPAA Verified?   Yes    Discharge Dx:   COVID-19 pneumonia        General:   • How have you been since your Shortness of Breath. 1 Inhaler 0   • guaiFENesin-codeine (CHERATUSSIN AC) 100-10 MG/5ML Oral Solution Take 5 mL by mouth every 6 (six) hours as needed for cough.  180 mL 0   • acetaminophen 325 MG Oral Tab Take 2 tablets (650 mg total) by mouth every 6 (six Date & Time Appointment Department [facility])    Apr 22, 2020 10:00 AM CDT Virtual Phone Visit with ELIO Montano 94 ([facility])    You have been scheduled for a Virtual Telephone visit wit for 4/14/2020. He understands that this will be a telephone visit. He denied having any questions or concerns at this time.      CCM referral placed:  No        [x]  Discharge Summary, Discharge medications reviewed/discussed/and reconciled against outpatie

## 2020-04-14 ENCOUNTER — TELEPHONE (OUTPATIENT)
Dept: OTHER | Age: 50
End: 2020-04-14

## 2020-04-14 PROBLEM — J12.82 PNEUMONIA DUE TO COVID-19 VIRUS: Status: ACTIVE | Noted: 2020-04-01

## 2020-04-14 PROBLEM — U07.1 PNEUMONIA DUE TO COVID-19 VIRUS: Status: ACTIVE | Noted: 2020-04-01

## 2020-04-14 NOTE — TELEPHONE ENCOUNTER
[facility] nursing–please check if this has arrived.   This may need to go to [facility]

## 2020-04-14 NOTE — TELEPHONE ENCOUNTER
Patient calling (verified name and ),states that he has forms from work that will need to fill up,fax number given [phone]. Office staff=please check the forms and give it to Dr Lona Peguero.

## 2020-04-15 ENCOUNTER — PATIENT MESSAGE (OUTPATIENT)
Dept: INTERNAL MEDICINE CLINIC | Facility: CLINIC | Age: 50
End: 2020-04-15

## 2020-04-15 ENCOUNTER — TELEPHONE (OUTPATIENT)
Dept: INTERNAL MEDICINE CLINIC | Facility: CLINIC | Age: 50
End: 2020-04-15

## 2020-04-15 NOTE — TELEPHONE ENCOUNTER
From: Roni Velazquez  To: Phoebe Blackburn MD  Sent: 4/15/2020 8:20 AM CDT  Subject: Other    Dr. Wayne Pereira, I will be off from work from 3/31/20 to 4/27/20. Can you please file out the attached FMLA medical forms and provide your signature?  This will allo

## 2020-04-15 NOTE — TELEPHONE ENCOUNTER
Forms dept received fmla and disab forms. Logged for processing. Sent [employer] message for missing hipaa.

## 2020-04-16 ENCOUNTER — TELEPHONE (OUTPATIENT)
Dept: INTERNAL MEDICINE CLINIC | Facility: CLINIC | Age: 50
End: 2020-04-16

## 2020-04-16 RX ORDER — HYDROCODONE POLISTIREX AND CHLORPHENIRAMINE POLISTIREX 10; 8 MG/5ML; MG/5ML
5 SUSPENSION, EXTENDED RELEASE ORAL 2 TIMES DAILY PRN
Qty: 100 ML | Refills: 0 | Status: SHIPPED | OUTPATIENT
Start: 2020-04-16 | End: 2020-04-17

## 2020-04-16 NOTE — TELEPHONE ENCOUNTER
Patient reports tele-visit with Dr Miles Michel 4/14 for pneumonia. Reports symptoms have been improving, continues to have cough that's worse at night, has been unable to sleep, with cough at night feels soreness to chest and back.  Has some SOB with walking but

## 2020-04-17 ENCOUNTER — TELEPHONE (OUTPATIENT)
Dept: INTERNAL MEDICINE CLINIC | Facility: CLINIC | Age: 50
End: 2020-04-17

## 2020-04-17 RX ORDER — HYDROCODONE POLISTIREX AND CHLORPHENIRAMINE POLISTIREX 10; 8 MG/5ML; MG/5ML
5 SUSPENSION, EXTENDED RELEASE ORAL 2 TIMES DAILY PRN
Qty: 100 ML | Refills: 0 | Status: SHIPPED | OUTPATIENT
Start: 2020-04-17 | End: 2020-06-03 | Stop reason: ALTCHOICE

## 2020-04-17 NOTE — TELEPHONE ENCOUNTER
Pharmacy is calling to notify doctor that they do not have Hydrocod Polst-CPM Polst ER (TUSSIONEX PENNKINETIC ER) 10-8 MG/5ML Oral Suspension Extended Release, but [facility] [place] [facility].  They would need the doctor to transfer the script ov

## 2020-04-17 NOTE — TELEPHONE ENCOUNTER
Elvira/ Registered Nurse of [employer] is requesting call back from nurse. Ashlee Aguilar states she has a couple of questions regarding when patient was last seen and recent BMI.     Please advise

## 2020-04-17 NOTE — TELEPHONE ENCOUNTER
Spoke with patient, (  verified ) informed [facility]     Patient verbalizes understanding

## 2020-04-17 NOTE — TELEPHONE ENCOUNTER
Hold off using the cpap till the cough and infection resolve about 2 weeks to reduce risk of contaminating the equipment    Will send in the tussionex-was not being dispensed as was just given the cheratussin.

## 2020-04-20 ENCOUNTER — TELEPHONE (OUTPATIENT)
Dept: CARDIOLOGY CLINIC | Facility: HOSPITAL | Age: 50
End: 2020-04-20

## 2020-04-20 ENCOUNTER — TELEPHONE (OUTPATIENT)
Dept: INTERNAL MEDICINE CLINIC | Facility: CLINIC | Age: 50
End: 2020-04-20

## 2020-04-20 RX ORDER — ALBUTEROL SULFATE 90 UG/1
2 AEROSOL, METERED RESPIRATORY (INHALATION) EVERY 4 HOURS PRN
Qty: 3 INHALER | Refills: 1 | Status: SHIPPED | OUTPATIENT
Start: 2020-04-20 | End: 2020-09-30

## 2020-04-20 NOTE — TELEPHONE ENCOUNTER
Spoke to Sakina Ferrari and informed of information regarding patient follow up and information requested. Sakina Ferrari verbalized understanding, patient last office visit/ follow up given BMI and Blood pressure as well as if patient was on any steroid inhalers.

## 2020-04-20 NOTE — TELEPHONE ENCOUNTER
Patient called to confirm his follow-up appointment for it 4/22/2020. Recent pneumonia, positive COVID-19. Discharged 4/12/2020.   Patient reports he still having LARSON walking up 7 stairs and walking more than 3 blocks and heart rate going from 90 bpm to 1

## 2020-04-20 NOTE — TELEPHONE ENCOUNTER
Medication change authorization    Albuterol HFA Inhaler 8.5gm  90 mcg Refills for a year ( 6 Month if a Controlled drug)  .   Current Outpatient Medications   Medication Sig Dispense Refill   • Albuterol Sulfate HFA (PROAIR HFA) 108 (90 Base) MCG/ACT Inhal

## 2020-04-21 ENCOUNTER — PATIENT OUTREACH (OUTPATIENT)
Dept: CASE MANAGEMENT | Age: 50
End: 2020-04-21

## 2020-04-21 NOTE — TELEPHONE ENCOUNTER
To: Tracy Hummel      From: Denzel Goodman RN      Created: 4/21/2020 2:42 PM        Amie Gleason, please call the Forms Department at [phone] to check on your FMLA.  Thank you, Denise MILLER

## 2020-04-21 NOTE — PROGRESS NOTES
Home Monitoring Condition Update    Covid19+ test date: 4/01/2020  Contact Date: 4/21/2020    Consent Verification:  Assessment Completed With: Patient  HIPAA Verified? Yes      Patient Reported Information:       How are you feeling today?  Still with s status       Nurse Interventions:     Patient reports off self-isolation 4/14/2020, 2 days after 4/12/2020 hospital discharge     Advised patient If symptoms worsen/ medical emergency to call 911.

## 2020-04-22 ENCOUNTER — PATIENT OUTREACH (OUTPATIENT)
Dept: CASE MANAGEMENT | Age: 50
End: 2020-04-22

## 2020-04-22 ENCOUNTER — TELEMEDICINE (OUTPATIENT)
Dept: CARDIOLOGY CLINIC | Facility: HOSPITAL | Age: 50
End: 2020-04-22
Attending: NURSE PRACTITIONER
Payer: COMMERCIAL

## 2020-04-22 ENCOUNTER — TELEPHONE (OUTPATIENT)
Dept: INTERNAL MEDICINE CLINIC | Facility: CLINIC | Age: 50
End: 2020-04-22

## 2020-04-22 ENCOUNTER — TELEPHONE (OUTPATIENT)
Dept: CASE MANAGEMENT | Age: 50
End: 2020-04-22

## 2020-04-22 DIAGNOSIS — U07.1 PNEUMONIA DUE TO COVID-19 VIRUS: Primary | ICD-10-CM

## 2020-04-22 DIAGNOSIS — G47.33 OBSTRUCTIVE SLEEP APNEA SYNDROME: ICD-10-CM

## 2020-04-22 DIAGNOSIS — R05.9 COUGH: ICD-10-CM

## 2020-04-22 DIAGNOSIS — J45.20 MILD INTERMITTENT ASTHMA WITHOUT COMPLICATION: ICD-10-CM

## 2020-04-22 DIAGNOSIS — J12.82 PNEUMONIA DUE TO COVID-19 VIRUS: Primary | ICD-10-CM

## 2020-04-22 PROCEDURE — 99443 PR TELEPHONE EVAL AND MGNT EST PATIENT 21-30 MIN MEDICAL DISCUSSION: CPT | Performed by: NURSE PRACTITIONER

## 2020-04-22 NOTE — TELEPHONE ENCOUNTER
Spoke to pt wife for follow up regarding home quarantine and need for food as well as a pulse ox. Pt wife stated pt is in need of a follow up appt post d/c. Please review and advise pt and/or wife.

## 2020-04-22 NOTE — TELEPHONE ENCOUNTER
Earlis Nissen NP from [facility] state she talked with pt today and would like pt to have in-office follow up appt with doctor as he was admitted to the hospital with Covid-19 on 4/1/2020 and discharged on 4/12/2020.  Pt symptoms are improv

## 2020-04-22 NOTE — TELEPHONE ENCOUNTER
Spoke to pt for Day 2 of home monitoring. Pt states he needs a pulse oximeter and has been unable to find one. Pt also needs help with grocery delivery.     Please assist.    Thanks

## 2020-04-22 NOTE — TELEPHONE ENCOUNTER
Pt called to check on status but forms recvd on 4/15/20. Sent a note on the fax coversheet of our turnaround time to [address] [place] [address] way - [phone].

## 2020-04-22 NOTE — TELEPHONE ENCOUNTER
Left message to call back  For [facility] Serum to call our office , see note below

## 2020-04-22 NOTE — PROGRESS NOTES
Home Monitoring Condition Update    Covid19+ test date: 4/1/2020  Contact Date: 4/22/2020    Consent Verification:  Assessment Completed With: Patient  HIPAA Verified? Yes    Patient Reported Information:       How are you feeling today?   \"I feel OK - management with patient:    • Reviewed instructions for self isolation   • Reviewed appropriate contacts for questions/concerns regarding patient status       Nurse Interventions:   Advised pt we would put in a TE to assist in procuring a pulse ox and/or g

## 2020-04-22 NOTE — TELEPHONE ENCOUNTER
Hospital policy does not allow us to see the patient yet. Please let the nurse practitioner know. We will see the patient when it is appropriate and patient will be scheduled at that time. Please schedule a telephone visit or a video visit at this time.

## 2020-04-22 NOTE — TELEPHONE ENCOUNTER
Spoke to pt wife who stated they were in need of a food delivery and were wondering if we had any inside resources because all local delivery services are a week or more out. Pt wife asked about a follow up visit for pt with pcp. I advised I will send a message to the office and they will follow up with appt for pt.

## 2020-04-22 NOTE — PROGRESS NOTES
[facility] Virtual phone visit      Tracie Amadeo Patient Status:  Outpatient    10/25/1970 MRN [id number]   Location [facility] MD Rusty Craigchet Childs is a 52year old male vide myalgias    Objective:  Lab Results   Component Value Date/Time    WBC 7.5 04/06/2020 05:33 AM    HGB 12.9 (L) 04/06/2020 05:33 AM    HCT 37.8 (L) 04/06/2020 05:33 AM    .0 04/06/2020 05:33 AM    CREATSERUM 0.92 04/06/2020 05:33 AM    BUN 8 04/06/20 shortness of breath, fever, chills, nausea vomiting or diarrhea.  -notify Dr. Shilpa Galvez if having persistent or increased red bumps or any rash or itching  -resume CPAP as directed by Dr. Shilpa Galvez ( hold off using cpap until cough has resolved , about 2 weeks f tablet, Rfl: 3  •  MONTELUKAST SODIUM 10 MG Oral Tab, TAKE 1 TABLET BY MOUTH AT BEDTIME, Disp: 30 tablet, Rfl: 3    Joan Meredith NP  4/22/2020

## 2020-04-23 ENCOUNTER — PATIENT OUTREACH (OUTPATIENT)
Dept: CASE MANAGEMENT | Age: 50
End: 2020-04-23

## 2020-04-23 ENCOUNTER — TELEPHONE (OUTPATIENT)
Dept: INTERNAL MEDICINE CLINIC | Facility: CLINIC | Age: 50
End: 2020-04-23

## 2020-04-23 RX ORDER — FLUTICASONE PROPIONATE AND SALMETEROL 100; 50 UG/1; UG/1
1 POWDER RESPIRATORY (INHALATION) 2 TIMES DAILY
Qty: 1 PACKAGE | Refills: 3 | Status: SHIPPED | OUTPATIENT
Start: 2020-04-23 | End: 2020-07-25

## 2020-04-23 NOTE — TELEPHONE ENCOUNTER
Please see patient's MyChart appointment below.     Appointment For: Anastasia Colin ([id number])   Visit Type: MYCHART FOLLOW UP (9228)      4/27/2020   11:30 AM  30 mins. Valeria Jackson MD         [facility]-INTERNAL MED      Patient Comments:   Fer

## 2020-04-23 NOTE — TELEPHONE ENCOUNTER
Spoke with patient ( verified) and reports he did receive pulse oximeter (ordered one month ago)--O2 sat 94-94% on RA. Patient also no longer needs assistance with grocery delivery.     Sent as Deepak Jimenez

## 2020-04-23 NOTE — TELEPHONE ENCOUNTER
Patient was contacted. Explained the need to wait till about the second week of May to be seen in the office. Please set up his next visit on the 27th as a video visit. He understands why we are requesting this. Sent in a prescription for Advair.

## 2020-04-23 NOTE — PROGRESS NOTES
Home Monitoring Condition Update    Covid19+ test date: 4/01/2020  Contact Date: 4/23/2020    Consent Verification:  Assessment Completed With: Patient  HIPAA Verified? Yes      Patient Reported Information:       How are you feeling today?  better  Do y was advised one of the nurses on our team will be calling daily over the next few days to give a condition update to PCP--patient verbalizes understanding and agreement. Patient continues to request in office visit with PCP, Dr. Abimael Gates.  \"I need someone

## 2020-04-23 NOTE — TELEPHONE ENCOUNTER
Spoke with patient ( verified) and offered telephone or video visit:         \"I need someone to listen to my lungs and assess my lung capacity--a telephone or video visit does me no good.  I have been off self-isolation since 2 days after I was discharg

## 2020-04-24 ENCOUNTER — PATIENT OUTREACH (OUTPATIENT)
Dept: CASE MANAGEMENT | Age: 50
End: 2020-04-24

## 2020-04-24 NOTE — TELEPHONE ENCOUNTER
Dr. Gill Abo forms    Please sign off on form:  -Highlight the patient and hit \"Chart\" button. -In Chart Review, w/in the Encounter tab - click 1 time on the Telephone call encounter for 4/15/2020. Scroll down the telephone encounter.   -C

## 2020-04-25 NOTE — PROGRESS NOTES
Home Monitoring Condition Update    Covid19+ test date: 4/1/20  Contact Date: 4/24/2020    Consent Verification:  Assessment Completed With: Patient  HIPAA Verified? Yes      Patient Reported Information:       How are you feeling today?   Since, Dr. Uma Odom 4/27/20. Advised patient if pulse increases 115 and oxygen saturation in the low 90's to go to ER.   The patient was directed to continue to isolate away from other household members when possible and stay completely isolated from the general public 3 days

## 2020-04-27 ENCOUNTER — TELEMEDICINE (OUTPATIENT)
Dept: INTERNAL MEDICINE CLINIC | Facility: CLINIC | Age: 50
End: 2020-04-27
Payer: COMMERCIAL

## 2020-04-27 ENCOUNTER — PATIENT OUTREACH (OUTPATIENT)
Dept: CASE MANAGEMENT | Age: 50
End: 2020-04-27

## 2020-04-27 VITALS — TEMPERATURE: 99 F | WEIGHT: 313 LBS | BODY MASS INDEX: 42 KG/M2 | OXYGEN SATURATION: 92 % | HEART RATE: 112 BPM

## 2020-04-27 DIAGNOSIS — U07.1 PNEUMONIA DUE TO COVID-19 VIRUS: Primary | ICD-10-CM

## 2020-04-27 DIAGNOSIS — I27.20 PULMONARY HTN (HCC): ICD-10-CM

## 2020-04-27 DIAGNOSIS — J12.82 PNEUMONIA DUE TO COVID-19 VIRUS: Primary | ICD-10-CM

## 2020-04-27 PROCEDURE — 99213 OFFICE O/P EST LOW 20 MIN: CPT | Performed by: INTERNAL MEDICINE

## 2020-04-27 NOTE — PROGRESS NOTES
Home Monitoring Condition Update    Covid19+ test date: 4/1/2020      Consent Verification:  Assessment Completed With: Patient  HIPAA Verified? Yes      Patient Reported Information:       How are you feeling today?  Slight SOB with exertion  Do you fee

## 2020-04-27 NOTE — TELEPHONE ENCOUNTER
Faxed FMLA & Disab forms to [address] [place] [address] - [phone]. Mailed copies to pt and notified via [employer].

## 2020-04-27 NOTE — ASSESSMENT & PLAN NOTE
Persistent dyspnea on exertion. Patient has been on the CPAP. Pulse ox remains above 90% but will need to be closely followed. Graduated exercise regimen discussed.   We will check a proBNP level and follow-up after completion

## 2020-04-27 NOTE — ASSESSMENT & PLAN NOTE
Severe pneumonia with bilateral infiltrates, admitted to the hospital in early April and discharged on April 11. Has been steadily improving. Fevers have resolved. Afebrile for about 7 to 8 days now. Continues to have exertional dyspnea.   Pulse ox on r

## 2020-04-27 NOTE — PROGRESS NOTES
The patient did not want to talk and would like a call back or he will call us at 4:00 pm.  He asked the call back number be sent on [employer] which was done.

## 2020-04-27 NOTE — PROGRESS NOTES
HPI:    Patient ID: Asuncion Perez is a 52year old male. Virtual/Telephone Check-In    Asuncion Perez verbally consents to a [employer] on 04/27/20.   Patient understands and accepts financial responsibility for any deducti • Hydrocod Polst-CPM Polst ER (TUSSIONEX PENNKINETIC ER) 10-8 MG/5ML Oral Suspension Extended Release Take 5 mL by mouth 2 (two) times daily as needed.  (Patient not taking: Reported on 4/22/2020 ) 100 mL 0   • Vitamin C 500 MG Oral Tab Take 1 tablet (500 Unprioritized    Pulmonary HTN ([address]     Persistent dyspnea on exertion. Patient has been on the CPAP. Pulse ox remains above 90% but will need to be closely followed. Graduated exercise regimen discussed.   We will check a proBNP level and follow-up aft follow-up in the next 7 to 14 days. Relevant Orders    CBC WITH DIFFERENTIAL WITH PLATELET (Completed)    COMP METABOLIC PANEL (14) (Completed)        Visit duration of 24 minutes.   Return in about 2 weeks (around 5/11/2020), or if symptoms worsen

## 2020-04-28 ENCOUNTER — PATIENT OUTREACH (OUTPATIENT)
Dept: CASE MANAGEMENT | Age: 50
End: 2020-04-28

## 2020-04-30 ENCOUNTER — PATIENT OUTREACH (OUTPATIENT)
Dept: CASE MANAGEMENT | Age: 50
End: 2020-04-30

## 2020-04-30 NOTE — PROGRESS NOTES
Home Monitoring Condition Update    Covid19+ test date: 4/1/2020      Consent Verification:  Assessment Completed With: Patient  HIPAA Verified? Yes      Patient Reported Information:       How are you feeling today?    \"Pretty good\"  Do you feel your medications, etc? No       Patient  Instructions:    • Nurse reviewed symptom management with patient:    • Reviewed instructions for self isolation   • Reviewed appropriate contacts for questions/concerns regarding patient status       Nurse Interventions

## 2020-04-30 NOTE — PROGRESS NOTES
The patient has the questions below and stated we can sent the response back vis mychart    1)  Should he resume using his CPAP machine. He stated was not suppose to. (At the virtual visit on 4/27/20 was to resume per notes.    I informed him  but wan

## 2020-05-01 ENCOUNTER — PATIENT OUTREACH (OUTPATIENT)
Dept: CASE MANAGEMENT | Age: 50
End: 2020-05-01

## 2020-05-02 ENCOUNTER — TELEPHONE (OUTPATIENT)
Dept: CASE MANAGEMENT | Age: 50
End: 2020-05-02

## 2020-05-02 NOTE — TELEPHONE ENCOUNTER
Patient was diagnosed with cold with pneumonia on April 5. He completed a full 4-week since diagnosis. He was discharged from the hospital on the 11th and he has done quite well with regular follow-ups. No further home monitoring program requirements.

## 2020-05-02 NOTE — PROGRESS NOTES
Home Monitoring Condition Update    Covid19+ test date: 4/01/2020  Contact date: 5/02/2020      Consent Verification:  Assessment Completed With: Patient  HIPAA Verified?   Yes      Patient Reported Information:       How are you feeling today? good  Do y condition update to PCP and that Covid f/u appt made for Thursday, 5/07/2020 at 10 a.m.--patient verbalizes understanding and agreement.     Patient advised to continue monitoring temperature and pulse at least twice a day and record until cleared by PCP--t

## 2020-05-02 NOTE — TELEPHONE ENCOUNTER
Spoke with patient for day #7 of 7 Covid Home Monitoring--VSS, cough, body aches and pain to ribs and back, \"definitely improving every day. \"    Please advise if patient needs to continue in home monitoring program or if he may be off self isolation and

## 2020-05-04 ENCOUNTER — PATIENT OUTREACH (OUTPATIENT)
Dept: CASE MANAGEMENT | Age: 50
End: 2020-05-04

## 2020-05-04 NOTE — PROGRESS NOTES
Per PCP notes below, patient is cleared. Removing from program.    Patient has Covid f/u video visit 5/07/2020 with PCP.         Daron Crawford MD   to [facility] Rn Triage        5/2/20 10:35 AM   Note      Patient was diagnosed with cold with pneumonia on April 5

## 2020-05-07 ENCOUNTER — TELEMEDICINE (OUTPATIENT)
Dept: INTERNAL MEDICINE CLINIC | Facility: CLINIC | Age: 50
End: 2020-05-07

## 2020-05-07 VITALS — HEART RATE: 86 BPM | SYSTOLIC BLOOD PRESSURE: 126 MMHG | DIASTOLIC BLOOD PRESSURE: 86 MMHG

## 2020-05-07 DIAGNOSIS — U07.1 PNEUMONIA DUE TO COVID-19 VIRUS: Primary | ICD-10-CM

## 2020-05-07 DIAGNOSIS — R00.2 PALPITATIONS: ICD-10-CM

## 2020-05-07 DIAGNOSIS — E55.9 VITAMIN D DEFICIENCY: ICD-10-CM

## 2020-05-07 DIAGNOSIS — J12.82 PNEUMONIA DUE TO COVID-19 VIRUS: Primary | ICD-10-CM

## 2020-05-07 DIAGNOSIS — I27.20 PULMONARY HTN (HCC): ICD-10-CM

## 2020-05-07 PROCEDURE — 99214 OFFICE O/P EST MOD 30 MIN: CPT | Performed by: INTERNAL MEDICINE

## 2020-05-08 NOTE — ASSESSMENT & PLAN NOTE
This has been supplemented–patient is on over-the-counter vitamin D 2000 units daily. Follow-up labs requested.

## 2020-05-08 NOTE — ASSESSMENT & PLAN NOTE
Palpitations with baseline heart rate at about 100-110. Pulse ox remains normal.  No chest pain, lightheadedness or dizziness. Gradually increasing his activity and is able to walk up to a block though slowly.   We will continue to monitor at this time as

## 2020-05-08 NOTE — PROGRESS NOTES
HPI:    Patient ID: Alexandra Terry is a 52year old male. Pneumonia   He complains of shortness of breath. There is no chest tightness or cough. This is a new problem. The current episode started more than 1 month ago.  Progression since onset: [place] Aero Soln Inhale 2 puffs into the lungs every 4 (four) hours as needed for Wheezing or Shortness of Breath.  3 Inhaler 1   • Hydrocod Polst-CPM Polst ER (TUSSIONEX PENNKINETIC ER) 10-8 MG/5ML Oral Suspension Extended Release Take 5 mL by mouth 2 (two) times reviewed. ASSESSMENT/PLAN:     Problem List Items Addressed This Visit        Unprioritized    Vitamin D deficiency     This has been supplemented–patient is on over-the-counter vitamin D 2000 units daily. Follow-up labs requested.          Rel Encounter      CBC W Differential W Platelet [E]      Comp Metabolic Panel (14) [E]      TSH W Reflex To Free T4 [E]      Vitamin B12 [E]      Vitamin D, 25-Hydroxy [E]      Meds This Visit:  Requested Prescriptions      No prescriptions requested or order

## 2020-05-08 NOTE — ASSESSMENT & PLAN NOTE
History of pulmonary hypertension, last echocardiogram prior to the COVID-19 pneumonia showed pulmonary pressures at 64. Patient has been on CPAP at this time.   Echocardiogram has been reordered, will plan to have this completed in August.

## 2020-05-08 NOTE — ASSESSMENT & PLAN NOTE
Patient with severe bilateral COVID-19 related pneumonia, hypoxia, discharge from the hospital in April 11, 2020. He was treated with ceftriaxone initially and then with Z-Jersey and Plaquenil. He has made significant recovery. His hypoxia has resolved.   C

## 2020-05-18 ENCOUNTER — TELEPHONE (OUTPATIENT)
Dept: INTERNAL MEDICINE CLINIC | Facility: CLINIC | Age: 50
End: 2020-05-18

## 2020-05-18 RX ORDER — ZINC SULFATE 50(220)MG
220 CAPSULE ORAL DAILY
Qty: 30 CAPSULE | Refills: 2 | Status: SHIPPED | OUTPATIENT
Start: 2020-05-18 | End: 2020-08-06

## 2020-05-18 NOTE — TELEPHONE ENCOUNTER
Patient request NEW rx:    Current Outpatient Medications   Medication Sig Dispense Refill   • zinc sulfate 220 (50 Zn) MG Oral Cap Take 1 capsule (220 mg total) by mouth daily.  30 capsule 0

## 2020-05-27 ENCOUNTER — LAB ENCOUNTER (OUTPATIENT)
Dept: LAB | Facility: HOSPITAL | Age: 50
End: 2020-05-27
Attending: INTERNAL MEDICINE
Payer: COMMERCIAL

## 2020-05-27 ENCOUNTER — HOSPITAL ENCOUNTER (OUTPATIENT)
Dept: GENERAL RADIOLOGY | Facility: HOSPITAL | Age: 50
Discharge: HOME | End: 2020-05-27
Attending: INTERNAL MEDICINE
Payer: COMMERCIAL

## 2020-05-27 DIAGNOSIS — U07.1 PNEUMONIA DUE TO COVID-19 VIRUS: ICD-10-CM

## 2020-05-27 DIAGNOSIS — R00.2 PALPITATIONS: ICD-10-CM

## 2020-05-27 DIAGNOSIS — J12.82 PNEUMONIA DUE TO COVID-19 VIRUS: ICD-10-CM

## 2020-05-27 DIAGNOSIS — E55.9 VITAMIN D DEFICIENCY: ICD-10-CM

## 2020-05-27 PROCEDURE — 85025 COMPLETE CBC W/AUTO DIFF WBC: CPT

## 2020-05-27 PROCEDURE — 83880 ASSAY OF NATRIURETIC PEPTIDE: CPT | Performed by: INTERNAL MEDICINE

## 2020-05-27 PROCEDURE — 82306 VITAMIN D 25 HYDROXY: CPT

## 2020-05-27 PROCEDURE — 36415 COLL VENOUS BLD VENIPUNCTURE: CPT

## 2020-05-27 PROCEDURE — 80053 COMPREHEN METABOLIC PANEL: CPT

## 2020-05-27 PROCEDURE — 71046 X-RAY EXAM CHEST 2 VIEWS: CPT | Performed by: INTERNAL MEDICINE

## 2020-05-27 PROCEDURE — 84443 ASSAY THYROID STIM HORMONE: CPT

## 2020-05-27 PROCEDURE — 82607 VITAMIN B-12: CPT

## 2020-05-28 ENCOUNTER — PATIENT MESSAGE (OUTPATIENT)
Dept: INTERNAL MEDICINE CLINIC | Facility: CLINIC | Age: 50
End: 2020-05-28

## 2020-05-29 NOTE — TELEPHONE ENCOUNTER
Closing this encounter because pt sent a 2nd message concerning same matter. See other [employer] message dated 5/28/20.

## 2020-05-29 NOTE — TELEPHONE ENCOUNTER
From: Taylor Kemp  To: Dewey Jhaevri MD  Sent: 5/28/2020 3:23 PM CDT  Subject: Visit Evelina Tennille Dr. Negro Peace you told me to schedule an appointment with you to review my test results and discuss action plan.  I plan to book June 3rd for

## 2020-06-03 ENCOUNTER — OFFICE VISIT (OUTPATIENT)
Dept: INTERNAL MEDICINE CLINIC | Facility: CLINIC | Age: 50
End: 2020-06-03
Payer: COMMERCIAL

## 2020-06-03 VITALS
HEIGHT: 72 IN | BODY MASS INDEX: 42.66 KG/M2 | DIASTOLIC BLOOD PRESSURE: 86 MMHG | WEIGHT: 315 LBS | HEART RATE: 81 BPM | SYSTOLIC BLOOD PRESSURE: 130 MMHG

## 2020-06-03 DIAGNOSIS — U07.1 PNEUMONIA DUE TO COVID-19 VIRUS: Primary | ICD-10-CM

## 2020-06-03 DIAGNOSIS — E78.5 HYPERLIPIDEMIA, UNSPECIFIED HYPERLIPIDEMIA TYPE: ICD-10-CM

## 2020-06-03 DIAGNOSIS — E55.9 VITAMIN D DEFICIENCY: ICD-10-CM

## 2020-06-03 DIAGNOSIS — J12.82 PNEUMONIA DUE TO COVID-19 VIRUS: Primary | ICD-10-CM

## 2020-06-03 PROCEDURE — 99214 OFFICE O/P EST MOD 30 MIN: CPT | Performed by: INTERNAL MEDICINE

## 2020-06-03 NOTE — PROGRESS NOTES
HPI:    Patient ID: Becka Darby is a 52year old male. CONCLUSION:   1. Borderline cardiomegaly. Tortuous thoracic aorta. 2. Mixed bilateral interstitial and alveolar opacification has improved.   Residual parenchymal abnormality may reflect s DISKUS) 100-50 MCG/DOSE Inhalation Aerosol Powder, Breath Activated Inhale 1 puff into the lungs 2 (two) times daily.  1 Package 3   • Albuterol Sulfate HFA (PROAIR HFA) 108 (90 Base) MCG/ACT Inhalation Aero Soln Inhale 2 puffs into the lungs every 4 (four) normal.  He does not have a cough, no fever at this time. Keep well-hydrated. Advised to avoid large crowds and exposure to people with infection as he will have higher risk of infections with exposure. Some weight loss should help.   Breathing exercises

## 2020-06-03 NOTE — PATIENT INSTRUCTIONS
Problem List Items Addressed This Visit        Unprioritized    Pneumonia due to COVID-19 virus - Primary     Patient with a history of severe bilateral COVID-19 related pneumonia.   He had persistent hypoxia even after discharge from the hospital that grad

## 2020-06-03 NOTE — ASSESSMENT & PLAN NOTE
Patient with a history of severe bilateral COVID-19 related pneumonia. He had persistent hypoxia even after discharge from the hospital that gradually improved. He was treated with ceftriaxone initially and then with Z-Jersey and Plaquenil.   Since then he h

## 2020-07-03 RX ORDER — ASCORBIC ACID 500 MG
TABLET ORAL
Qty: 60 TABLET | Refills: 0 | Status: SHIPPED | OUTPATIENT
Start: 2020-07-03 | End: 2020-07-27

## 2020-07-25 ENCOUNTER — PATIENT MESSAGE (OUTPATIENT)
Dept: INTERNAL MEDICINE CLINIC | Facility: CLINIC | Age: 50
End: 2020-07-25

## 2020-07-26 RX ORDER — FLUTICASONE PROPIONATE AND SALMETEROL 100; 50 UG/1; UG/1
1 POWDER RESPIRATORY (INHALATION) 2 TIMES DAILY
Qty: 1 PACKAGE | Refills: 3 | Status: SHIPPED | OUTPATIENT
Start: 2020-07-26 | End: 2020-07-28

## 2020-07-26 NOTE — TELEPHONE ENCOUNTER
From: Bee Holloway  To: Kimmy Wolff MD  Sent: 7/25/2020 8:19 PM CDT  Subject: Prescription Question    Dr. Ashley Eduardo,    I need a prescription refill on Advair Diskus 60's100/50.  The Pharmaceutical company is reaching out to your office for a prescr

## 2020-07-26 NOTE — TELEPHONE ENCOUNTER
Routed to Dr Shilpa Galvez for advise, thanks.       Requested Prescriptions     Pending Prescriptions Disp Refills   • fluticasone-salmeterol (ADVAIR DISKUS) 100-50 MCG/DOSE Inhalation Aerosol Powder, Breath Activated 1 Package 3     Sig: Inhale 1 puff into the l

## 2020-07-27 RX ORDER — ASCORBIC ACID 500 MG
TABLET ORAL
Qty: 60 TABLET | Refills: 0 | Status: SHIPPED | OUTPATIENT
Start: 2020-07-27 | End: 2020-08-31

## 2020-07-28 ENCOUNTER — TELEPHONE (OUTPATIENT)
Dept: INTERNAL MEDICINE CLINIC | Facility: CLINIC | Age: 50
End: 2020-07-28

## 2020-07-28 NOTE — TELEPHONE ENCOUNTER
Patient is calling and states that fluticasone-salmeterol (ADVAIR DISKUS) 100-50 MCG/DOSE Inhalation Aerosol Powder, Breath Activated needs to be sent to [facility]. Patient is wanting it transferred over and a 90 day supply

## 2020-07-28 NOTE — TELEPHONE ENCOUNTER
Please see patient's message below and advise    Advair re-pended to go to [facility] per patient request    7/26/2020 refill was for one package with 3 refills--patient requesting 90 day supply sent to mail order

## 2020-07-30 RX ORDER — FLUTICASONE PROPIONATE AND SALMETEROL 100; 50 UG/1; UG/1
1 POWDER RESPIRATORY (INHALATION) 2 TIMES DAILY
Qty: 3 PACKAGE | Refills: 3 | Status: SHIPPED | OUTPATIENT
Start: 2020-07-30 | End: 2021-01-22

## 2020-08-06 RX ORDER — MAGNESIUM OXIDE 400 MG (241.3 MG MAGNESIUM) TABLET
TABLET
Qty: 30 TABLET | Refills: 0 | Status: SHIPPED | OUTPATIENT
Start: 2020-08-06 | End: 2021-11-05

## 2020-08-21 ENCOUNTER — LAB ENCOUNTER (OUTPATIENT)
Dept: LAB | Facility: HOSPITAL | Age: 50
End: 2020-08-21
Attending: UROLOGY
Payer: COMMERCIAL

## 2020-08-21 DIAGNOSIS — Z12.5 PROSTATE CANCER SCREENING: ICD-10-CM

## 2020-08-21 DIAGNOSIS — E55.9 VITAMIN D DEFICIENCY: ICD-10-CM

## 2020-08-21 DIAGNOSIS — J12.82 PNEUMONIA DUE TO COVID-19 VIRUS: ICD-10-CM

## 2020-08-21 DIAGNOSIS — U07.1 PNEUMONIA DUE TO COVID-19 VIRUS: ICD-10-CM

## 2020-08-21 DIAGNOSIS — E78.5 HYPERLIPIDEMIA, UNSPECIFIED HYPERLIPIDEMIA TYPE: ICD-10-CM

## 2020-08-21 LAB
ALBUMIN SERPL-MCNC: 3.4 G/DL (ref 3.4–5)
ALBUMIN/GLOB SERPL: 0.7 {RATIO} (ref 1–2)
ALP LIVER SERPL-CCNC: 114 U/L (ref 45–117)
ALT SERPL-CCNC: 29 U/L (ref 16–61)
ANION GAP SERPL CALC-SCNC: 4 MMOL/L (ref 0–18)
AST SERPL-CCNC: 24 U/L (ref 15–37)
BASOPHILS # BLD AUTO: 0.05 X10(3) UL (ref 0–0.2)
BASOPHILS NFR BLD AUTO: 0.7 %
BILIRUB SERPL-MCNC: 0.4 MG/DL (ref 0.1–2)
BILIRUB UR QL: NEGATIVE
BUN BLD-MCNC: 11 MG/DL (ref 7–18)
BUN/CREAT SERPL: 8.5 (ref 10–20)
CALCIUM BLD-MCNC: 9.5 MG/DL (ref 8.5–10.1)
CHLORIDE SERPL-SCNC: 109 MMOL/L (ref 98–112)
CHOLEST SMN-MCNC: 198 MG/DL (ref ?–200)
CLARITY UR: CLEAR
CO2 SERPL-SCNC: 28 MMOL/L (ref 21–32)
COLOR UR: YELLOW
COMPLEXED PSA SERPL-MCNC: 1.21 NG/ML (ref ?–4)
CREAT BLD-MCNC: 1.29 MG/DL (ref 0.7–1.3)
DEPRECATED RDW RBC AUTO: 46 FL (ref 35.1–46.3)
EOSINOPHIL # BLD AUTO: 0.26 X10(3) UL (ref 0–0.7)
EOSINOPHIL NFR BLD AUTO: 3.4 %
ERYTHROCYTE [DISTWIDTH] IN BLOOD BY AUTOMATED COUNT: 15.1 % (ref 11–15)
GLOBULIN PLAS-MCNC: 4.6 G/DL (ref 2.8–4.4)
GLUCOSE BLD-MCNC: 78 MG/DL (ref 70–99)
GLUCOSE UR-MCNC: NEGATIVE MG/DL
HCT VFR BLD AUTO: 42.8 % (ref 39–53)
HDLC SERPL-MCNC: 44 MG/DL (ref 40–59)
HGB BLD-MCNC: 14.3 G/DL (ref 13–17.5)
HGB UR QL STRIP.AUTO: NEGATIVE
IMM GRANULOCYTES # BLD AUTO: 0.02 X10(3) UL (ref 0–1)
IMM GRANULOCYTES NFR BLD: 0.3 %
KETONES UR-MCNC: NEGATIVE MG/DL
LDLC SERPL CALC-MCNC: 127 MG/DL (ref ?–100)
LEUKOCYTE ESTERASE UR QL STRIP.AUTO: NEGATIVE
LYMPHOCYTES # BLD AUTO: 3.47 X10(3) UL (ref 1–4)
LYMPHOCYTES NFR BLD AUTO: 45.4 %
M PROTEIN MFR SERPL ELPH: 8 G/DL (ref 6.4–8.2)
MCH RBC QN AUTO: 27.8 PG (ref 26–34)
MCHC RBC AUTO-ENTMCNC: 33.4 G/DL (ref 31–37)
MCV RBC AUTO: 83.1 FL (ref 80–100)
MONOCYTES # BLD AUTO: 0.71 X10(3) UL (ref 0.1–1)
MONOCYTES NFR BLD AUTO: 9.3 %
NEUTROPHILS # BLD AUTO: 3.14 X10 (3) UL (ref 1.5–7.7)
NEUTROPHILS # BLD AUTO: 3.14 X10(3) UL (ref 1.5–7.7)
NEUTROPHILS NFR BLD AUTO: 40.9 %
NITRITE UR QL STRIP.AUTO: NEGATIVE
NONHDLC SERPL-MCNC: 154 MG/DL (ref ?–130)
OSMOLALITY SERPL CALC.SUM OF ELEC: 290 MOSM/KG (ref 275–295)
PATIENT FASTING Y/N/NP: NO
PATIENT FASTING Y/N/NP: NO
PH UR: 6 [PH] (ref 5–8)
PLATELET # BLD AUTO: 416 10(3)UL (ref 150–450)
POTASSIUM SERPL-SCNC: 4.4 MMOL/L (ref 3.5–5.1)
PROT UR-MCNC: NEGATIVE MG/DL
RBC # BLD AUTO: 5.15 X10(6)UL (ref 4.3–5.7)
SODIUM SERPL-SCNC: 141 MMOL/L (ref 136–145)
SP GR UR STRIP: 1.02 (ref 1–1.03)
TRIGL SERPL-MCNC: 134 MG/DL (ref 30–149)
TSI SER-ACNC: 1.64 MIU/ML (ref 0.36–3.74)
UROBILINOGEN UR STRIP-ACNC: <2
VIT B12 SERPL-MCNC: 427 PG/ML (ref 193–986)
VLDLC SERPL CALC-MCNC: 27 MG/DL (ref 0–30)
WBC # BLD AUTO: 7.7 X10(3) UL (ref 4–11)

## 2020-08-21 PROCEDURE — 80053 COMPREHEN METABOLIC PANEL: CPT

## 2020-08-21 PROCEDURE — 80061 LIPID PANEL: CPT

## 2020-08-21 PROCEDURE — 84443 ASSAY THYROID STIM HORMONE: CPT

## 2020-08-21 PROCEDURE — 82607 VITAMIN B-12: CPT

## 2020-08-21 PROCEDURE — 85025 COMPLETE CBC W/AUTO DIFF WBC: CPT

## 2020-08-21 PROCEDURE — 81003 URINALYSIS AUTO W/O SCOPE: CPT

## 2020-08-21 PROCEDURE — 82306 VITAMIN D 25 HYDROXY: CPT

## 2020-08-21 PROCEDURE — 36415 COLL VENOUS BLD VENIPUNCTURE: CPT

## 2020-08-24 LAB — 25(OH)D3 SERPL-MCNC: 28.5 NG/ML (ref 30–100)

## 2020-08-31 RX ORDER — ASCORBIC ACID 500 MG
TABLET ORAL
Qty: 60 TABLET | Refills: 0 | Status: SHIPPED | OUTPATIENT
Start: 2020-08-31 | End: 2020-11-22

## 2020-08-31 RX ORDER — MONTELUKAST SODIUM 10 MG/1
10 TABLET ORAL NIGHTLY
Qty: 30 TABLET | Refills: 3 | Status: SHIPPED | OUTPATIENT
Start: 2020-08-31 | End: 2020-11-30

## 2020-08-31 RX ORDER — MONTELUKAST SODIUM 10 MG/1
10 TABLET ORAL NIGHTLY
Qty: 30 TABLET | Refills: 3 | OUTPATIENT
Start: 2020-08-31

## 2020-09-09 ENCOUNTER — MED REC SCAN ONLY (OUTPATIENT)
Dept: INTERNAL MEDICINE CLINIC | Facility: CLINIC | Age: 50
End: 2020-09-09

## 2020-09-16 ENCOUNTER — TELEPHONE (OUTPATIENT)
Dept: INTERNAL MEDICINE CLINIC | Facility: CLINIC | Age: 50
End: 2020-09-16

## 2020-09-16 NOTE — TELEPHONE ENCOUNTER
Reviewed all lab results as seen in [address] [place] [address] Rd. Pt verbalized understanding.   Advised to still keep f/u appt as  has asked that he f/u in aug at the 06/2020 OV>

## 2020-09-16 NOTE — TELEPHONE ENCOUNTER
Pt scheduled appt through [employer] with following sx:    Appointment For: Claudell Poser ([id number])   Visit Type: [employer] FOLLOW UP [phone])      9/17/2020    5:30 PM  30 mins. Marly Santiago MD         [facility]-INTERNAL MED      Patient Comments:    Follow up fr

## 2020-09-17 ENCOUNTER — OFFICE VISIT (OUTPATIENT)
Dept: INTERNAL MEDICINE CLINIC | Facility: CLINIC | Age: 50
End: 2020-09-17
Payer: COMMERCIAL

## 2020-09-17 VITALS
OXYGEN SATURATION: 97 % | WEIGHT: 315 LBS | RESPIRATION RATE: 22 BRPM | HEART RATE: 71 BPM | HEIGHT: 72 IN | DIASTOLIC BLOOD PRESSURE: 75 MMHG | SYSTOLIC BLOOD PRESSURE: 118 MMHG | BODY MASS INDEX: 42.66 KG/M2

## 2020-09-17 DIAGNOSIS — Z86.16 HISTORY OF 2019 NOVEL CORONAVIRUS DISEASE (COVID-19): ICD-10-CM

## 2020-09-17 DIAGNOSIS — G47.33 OBSTRUCTIVE SLEEP APNEA SYNDROME: ICD-10-CM

## 2020-09-17 DIAGNOSIS — I27.20 PULMONARY HTN (HCC): Primary | ICD-10-CM

## 2020-09-17 DIAGNOSIS — J45.20 MILD INTERMITTENT ASTHMA WITHOUT COMPLICATION: ICD-10-CM

## 2020-09-17 DIAGNOSIS — E55.9 VITAMIN D DEFICIENCY: ICD-10-CM

## 2020-09-17 PROCEDURE — 99214 OFFICE O/P EST MOD 30 MIN: CPT | Performed by: INTERNAL MEDICINE

## 2020-09-17 PROCEDURE — 3074F SYST BP LT 130 MM HG: CPT | Performed by: INTERNAL MEDICINE

## 2020-09-17 PROCEDURE — 3008F BODY MASS INDEX DOCD: CPT | Performed by: INTERNAL MEDICINE

## 2020-09-17 PROCEDURE — 90471 IMMUNIZATION ADMIN: CPT | Performed by: INTERNAL MEDICINE

## 2020-09-17 PROCEDURE — 3078F DIAST BP <80 MM HG: CPT | Performed by: INTERNAL MEDICINE

## 2020-09-17 PROCEDURE — 90686 IIV4 VACC NO PRSV 0.5 ML IM: CPT | Performed by: INTERNAL MEDICINE

## 2020-09-17 NOTE — ASSESSMENT & PLAN NOTE
History of COVID-19 related infection with multiple complications especially pneumonia, hypoxia. He has eventually recovered. He has been exercising on a regular basis. He does not have any symptoms of COVID associated coagulopathy.   Discussed the need

## 2020-09-17 NOTE — ASSESSMENT & PLAN NOTE
NAINA on CPAP. He did undergo nasal turbinate reduction surgery and had decided to stop the CPAP. Advised to continue using the CPAP at this time until the echocardiogram is evaluated.   We will follow-up after completion

## 2020-09-17 NOTE — ASSESSMENT & PLAN NOTE
Patient has been restarted on vitamin D at 50,000 units weekly. Continue on the same at this time. We will recheck labs in 6 months.

## 2020-09-17 NOTE — PATIENT INSTRUCTIONS
Problem List Items Addressed This Visit        Unprioritized    History of 2019 novel coronavirus disease (COVID-19)     History of COVID-19 related infection with multiple complications especially pneumonia, hypoxia. He has eventually recovered.   He has

## 2020-09-17 NOTE — ASSESSMENT & PLAN NOTE
History of mild intermittent asthma, advised to continue to use his inhalers, will follow-up after the echo and chest x-ray are completed.

## 2020-09-17 NOTE — PROGRESS NOTES
HPI:    Patient ID: Barbie Pablo is a 52year old male. Pneumonia (h/o severe B/L COVID-19 related pneumonia)      Written by Reza Guallpa MD on 9/14/2020  5:56 AM   The blood counts look normal-no anemia.    The thyroid function test look n Negative. Cardiovascular: Positive for dyspnea on exertion. Gastrointestinal: Negative. Genitourinary: Negative. Musculoskeletal: Negative. Skin: Negative. Neurological: Negative. Psychiatric/Behavioral: Negative.              Current Ou oropharyngeal exudate. Eyes: Pupils are equal, round, and reactive to light. Conjunctivae and EOM are normal.   Neck: Normal range of motion. Neck supple. No JVD present. No thyromegaly present.    Cardiovascular: Normal rate, regular rhythm, normal heart especially pneumonia, hypoxia. He has eventually recovered. He has been exercising on a regular basis. He does not have any symptoms of COVID associated coagulopathy. Discussed the need to start on a baby aspirin 81 mg daily.   Will reassess needs at th

## 2020-09-17 NOTE — ASSESSMENT & PLAN NOTE
History of pulmonary hypertension, he did undergo COVID-19 pneumonia related changes with pulmonary pressures noted at 56. Since then patient has restarted on his CPAP.   Due for a follow-up echocardiogram–printed out the request and have discussed with th

## 2020-09-25 ENCOUNTER — HOSPITAL ENCOUNTER (OUTPATIENT)
Dept: GENERAL RADIOLOGY | Facility: HOSPITAL | Age: 50
Discharge: HOME | End: 2020-09-25
Attending: INTERNAL MEDICINE
Payer: COMMERCIAL

## 2020-09-25 ENCOUNTER — LAB ENCOUNTER (OUTPATIENT)
Dept: LAB | Facility: HOSPITAL | Age: 50
End: 2020-09-25
Attending: INTERNAL MEDICINE
Payer: COMMERCIAL

## 2020-09-25 DIAGNOSIS — U07.1 PNEUMONIA DUE TO COVID-19 VIRUS: ICD-10-CM

## 2020-09-25 DIAGNOSIS — J12.82 PNEUMONIA DUE TO COVID-19 VIRUS: ICD-10-CM

## 2020-09-25 DIAGNOSIS — Z01.812 PRE-PROCEDURE LAB EXAM: Primary | ICD-10-CM

## 2020-09-25 LAB — SARS-COV-2 RNA RESP QL NAA+PROBE: NOT DETECTED

## 2020-09-25 PROCEDURE — 71046 X-RAY EXAM CHEST 2 VIEWS: CPT | Performed by: INTERNAL MEDICINE

## 2020-09-28 ENCOUNTER — HOSPITAL ENCOUNTER (OUTPATIENT)
Dept: CV DIAGNOSTICS | Facility: HOSPITAL | Age: 50
Discharge: HOME | End: 2020-09-28
Attending: INTERNAL MEDICINE
Payer: COMMERCIAL

## 2020-09-28 DIAGNOSIS — I27.20 PULMONARY HTN (HCC): ICD-10-CM

## 2020-09-28 PROCEDURE — 93306 TTE W/DOPPLER COMPLETE: CPT | Performed by: INTERNAL MEDICINE

## 2020-09-28 PROCEDURE — 93017 CV STRESS TEST TRACING ONLY: CPT | Performed by: INTERNAL MEDICINE

## 2020-09-28 PROCEDURE — 93016 CV STRESS TEST SUPVJ ONLY: CPT | Performed by: INTERNAL MEDICINE

## 2020-09-28 PROCEDURE — 93018 CV STRESS TEST I&R ONLY: CPT | Performed by: INTERNAL MEDICINE

## 2020-10-03 RX ORDER — ALBUTEROL SULFATE 90 UG/1
AEROSOL, METERED RESPIRATORY (INHALATION)
Qty: 3 INHALER | Refills: 1 | Status: SHIPPED | OUTPATIENT
Start: 2020-10-03 | End: 2021-01-22

## 2020-11-23 RX ORDER — ASCORBIC ACID 500 MG
500 TABLET ORAL 2 TIMES DAILY
Qty: 60 TABLET | Refills: 5 | Status: SHIPPED | OUTPATIENT
Start: 2020-11-23 | End: 2021-02-26

## 2020-11-30 RX ORDER — MONTELUKAST SODIUM 10 MG/1
TABLET ORAL
Qty: 30 TABLET | Refills: 11 | Status: SHIPPED | OUTPATIENT
Start: 2020-11-30 | End: 2021-01-22

## 2021-01-19 ENCOUNTER — OFFICE VISIT (OUTPATIENT)
Dept: OTOLARYNGOLOGY | Facility: CLINIC | Age: 51
End: 2021-01-19
Payer: COMMERCIAL

## 2021-01-19 VITALS
HEIGHT: 72 IN | TEMPERATURE: 98 F | BODY MASS INDEX: 42.66 KG/M2 | DIASTOLIC BLOOD PRESSURE: 83 MMHG | WEIGHT: 315 LBS | SYSTOLIC BLOOD PRESSURE: 128 MMHG

## 2021-01-19 DIAGNOSIS — J01.90 ACUTE NON-RECURRENT SINUSITIS, UNSPECIFIED LOCATION: Primary | ICD-10-CM

## 2021-01-19 DIAGNOSIS — R05.9 COUGH: ICD-10-CM

## 2021-01-19 PROCEDURE — 3008F BODY MASS INDEX DOCD: CPT | Performed by: OTOLARYNGOLOGY

## 2021-01-19 PROCEDURE — 3074F SYST BP LT 130 MM HG: CPT | Performed by: OTOLARYNGOLOGY

## 2021-01-19 PROCEDURE — 31575 DIAGNOSTIC LARYNGOSCOPY: CPT | Performed by: OTOLARYNGOLOGY

## 2021-01-19 PROCEDURE — 99213 OFFICE O/P EST LOW 20 MIN: CPT | Performed by: OTOLARYNGOLOGY

## 2021-01-19 PROCEDURE — 3079F DIAST BP 80-89 MM HG: CPT | Performed by: OTOLARYNGOLOGY

## 2021-01-19 RX ORDER — PROMETHAZINE HYDROCHLORIDE AND CODEINE PHOSPHATE 6.25; 1 MG/5ML; MG/5ML
2.5 SYRUP ORAL EVERY 4 HOURS PRN
Qty: 250 ML | Refills: 0 | Status: SHIPPED | OUTPATIENT
Start: 2021-01-19 | End: 2021-01-22

## 2021-01-19 RX ORDER — PSEUDOEPHEDRINE HCL 120 MG/1
120 TABLET, FILM COATED, EXTENDED RELEASE ORAL EVERY 12 HOURS
Qty: 60 TABLET | Refills: 3 | Status: SHIPPED | OUTPATIENT
Start: 2021-01-19 | End: 2021-11-05

## 2021-01-19 RX ORDER — AZELASTINE 1 MG/ML
2 SPRAY, METERED NASAL 2 TIMES DAILY
Qty: 1 BOTTLE | Refills: 0 | Status: SHIPPED | OUTPATIENT
Start: 2021-01-19 | End: 2021-02-24

## 2021-01-19 RX ORDER — AMOXICILLIN AND CLAVULANATE POTASSIUM 875; 125 MG/1; MG/1
1 TABLET, FILM COATED ORAL EVERY 12 HOURS
Qty: 20 TABLET | Refills: 0 | Status: SHIPPED | OUTPATIENT
Start: 2021-01-19 | End: 2021-03-05

## 2021-01-19 NOTE — PROGRESS NOTES
Jerrica Sapp is a 48year old male.   Patient presents with:  Cough: Patient Presents with: Constant cough with phlegm, at night for 2-3 wks, per pt no fever body aches or chills, had covid in [place] [address]  Nose Problem: trouble breathing at night full and pressured. Symptoms seem to have worsened with change in temperature and environment. Currently using Singulair on a daily basis but not using any other medications or sprays.   Breathing is overall improved despite acute sinus-like symptoms at t diverticulitis    • Hypercholesterolemia 2013   • Morbid obesity due to excess calories ([facility] [address]    • Obstructive sleep apnea syndrome 6/7/2018   • Paroxysmal atrial fibrillation ([facility] [address] 2013   • Sleep apnea     uses CPAP   • Torn meniscus     LEFT LEG   • Cindy Normal. Eyebrows - Normal. Skull - Normal.        Nasopharynx Normal External nose - Normal. Lips/teeth/gums - Normal. Tonsils - Normal. Oropharynx - Normal.   Ears Normal Inspection - Right: Normal, Left: Normal. Canal - Right: Normal, Left: Normal. TM - Amoxicillin-Pot Clavulanate 875-125 MG Oral Tab, Take 1 tablet by mouth every 12 (twelve) hours. , Disp: 20 tablet, Rfl: 0  •  Pseudoephedrine HCl  MG Oral Tablet 12 Hr, Take 1 tablet (120 mg total) by mouth every 12 (twelve) hours. , Disp: 60 tablet, prepared using [facility] [employer] voice recognition dictation software. As a result errors may occur. When identified these errors have been corrected. While every attempt is made to correct errors during dictation discrepancies may still exist    Frank Medina

## 2021-01-22 PROBLEM — K21.00 GASTROESOPHAGEAL REFLUX DISEASE WITH ESOPHAGITIS WITHOUT HEMORRHAGE: Status: ACTIVE | Noted: 2021-01-22

## 2021-01-22 PROBLEM — E66.01 OBESITY, MORBID (HCC): Status: ACTIVE | Noted: 2019-02-06

## 2021-01-22 PROBLEM — R10.12 LUQ PAIN: Status: ACTIVE | Noted: 2021-01-22

## 2021-02-24 RX ORDER — AZELASTINE 1 MG/ML
SPRAY, METERED NASAL
Qty: 1 BOTTLE | Refills: 0 | Status: SHIPPED | OUTPATIENT
Start: 2021-02-24 | End: 2021-05-05

## 2021-02-26 ENCOUNTER — OFFICE VISIT (OUTPATIENT)
Dept: OTOLARYNGOLOGY | Facility: CLINIC | Age: 51
End: 2021-02-26
Payer: COMMERCIAL

## 2021-02-26 VITALS
WEIGHT: 315 LBS | DIASTOLIC BLOOD PRESSURE: 80 MMHG | TEMPERATURE: 98 F | BODY MASS INDEX: 46 KG/M2 | SYSTOLIC BLOOD PRESSURE: 126 MMHG

## 2021-02-26 DIAGNOSIS — J30.9 ALLERGIC RHINITIS, UNSPECIFIED SEASONALITY, UNSPECIFIED TRIGGER: Primary | ICD-10-CM

## 2021-02-26 PROCEDURE — 99213 OFFICE O/P EST LOW 20 MIN: CPT | Performed by: OTOLARYNGOLOGY

## 2021-02-26 PROCEDURE — 3074F SYST BP LT 130 MM HG: CPT | Performed by: OTOLARYNGOLOGY

## 2021-02-26 PROCEDURE — 3079F DIAST BP 80-89 MM HG: CPT | Performed by: OTOLARYNGOLOGY

## 2021-02-26 RX ORDER — FLUTICASONE PROPIONATE 50 MCG
1 SPRAY, SUSPENSION (ML) NASAL 2 TIMES DAILY
Qty: 1 BOTTLE | Refills: 3 | Status: SHIPPED | OUTPATIENT
Start: 2021-02-26 | End: 2021-05-05

## 2021-02-26 RX ORDER — GLYCOPYRROLATE 2 MG/1
2 TABLET ORAL 3 TIMES DAILY
Qty: 90 TABLET | Refills: 1 | Status: SHIPPED | OUTPATIENT
Start: 2021-02-26 | End: 2021-04-22

## 2021-02-26 RX ORDER — ASCORBIC ACID 500 MG
500 TABLET ORAL 2 TIMES DAILY
Qty: 60 TABLET | Refills: 5 | Status: SHIPPED | OUTPATIENT
Start: 2021-02-26

## 2021-02-26 RX ORDER — MAGNESIUM OXIDE 400 MG (241.3 MG MAGNESIUM) TABLET
1 TABLET DAILY
Qty: 30 TABLET | Refills: 0 | OUTPATIENT
Start: 2021-02-26

## 2021-02-26 RX ORDER — PSEUDOEPHEDRINE HCL 120 MG/1
120 TABLET, FILM COATED, EXTENDED RELEASE ORAL EVERY 12 HOURS
Qty: 60 TABLET | Refills: 3 | OUTPATIENT
Start: 2021-02-26

## 2021-02-26 RX ORDER — MAGNESIUM OXIDE 400 MG (241.3 MG MAGNESIUM) TABLET
TABLET
Qty: 30 TABLET | Refills: 0 | OUTPATIENT
Start: 2021-02-26

## 2021-02-26 NOTE — TELEPHONE ENCOUNTER
Last refill: 1/19/21, #60 refill 3. Should have refills at pharmacy. Notified patient to check with pharmacy.

## 2021-02-26 NOTE — PROGRESS NOTES
Jessica Crane is a 48year old male. Patient presents with: Follow - Up: Sinusitis f/u.  Feeling better , still having nasal congestion at nights      HISTORY OF PRESENT ILLNESS  He presents with a recent CT scan performed in the last 2 weeks. Pam Boyle using Singulair on a daily basis but not using any other medications or sprays.  Breathing is overall improved despite acute sinus-like symptoms at this time.  No tobacco use.  Allergies?  I do not suspect he has had any previous allergy testing.  No other Yes        Frequency: 2-4 times a month        Comment: 1-2x/month      Drug use: Not Currently        Types: Cannabis        Comment: last use - 2018      Family History   Problem Relation Age of Onset   • Diabetes Brother    • High Blood Pressure Brother Normal Overall appearance - Normal.   Psychiatric Normal Orientation - Oriented to time, place, person & situation. Appropriate mood and affect.    Neck Exam Normal Inspection - Normal. Palpation - Normal. Parotid gland - Normal. Thyroid gland - Normal.   E mouth 2 (two) times daily. , Disp: 60 tablet, Rfl: 5  •  ZINC SULFATE 220 (50 Zn) MG Oral Tab, TAKE 1 TABLET BY MOUTH EVERY DAY, Disp: 30 tablet, Rfl: 0  •  Cholecalciferol (VITAMIN D3) 50 MCG (2000 UT) Oral Tab, Take 1 tablet by mouth daily. , Disp: , Rfl:

## 2021-03-05 PROBLEM — D18.09 HEMANGIOMA OF SPINE: Status: ACTIVE | Noted: 2021-03-05

## 2021-03-26 ENCOUNTER — OFFICE VISIT (OUTPATIENT)
Dept: OTOLARYNGOLOGY | Facility: CLINIC | Age: 51
End: 2021-03-26
Payer: COMMERCIAL

## 2021-03-26 VITALS
HEIGHT: 73 IN | WEIGHT: 315 LBS | TEMPERATURE: 98 F | HEART RATE: 70 BPM | SYSTOLIC BLOOD PRESSURE: 115 MMHG | BODY MASS INDEX: 41.75 KG/M2 | RESPIRATION RATE: 18 BRPM | DIASTOLIC BLOOD PRESSURE: 70 MMHG

## 2021-03-26 DIAGNOSIS — J30.9 ALLERGIC RHINITIS, UNSPECIFIED SEASONALITY, UNSPECIFIED TRIGGER: Primary | ICD-10-CM

## 2021-03-26 PROCEDURE — 3008F BODY MASS INDEX DOCD: CPT | Performed by: OTOLARYNGOLOGY

## 2021-03-26 PROCEDURE — 99213 OFFICE O/P EST LOW 20 MIN: CPT | Performed by: OTOLARYNGOLOGY

## 2021-03-26 PROCEDURE — 3074F SYST BP LT 130 MM HG: CPT | Performed by: OTOLARYNGOLOGY

## 2021-03-26 PROCEDURE — 3078F DIAST BP <80 MM HG: CPT | Performed by: OTOLARYNGOLOGY

## 2021-03-26 RX ORDER — LORATADINE 10 MG/1
10 TABLET ORAL DAILY
COMMUNITY

## 2021-03-26 NOTE — PROGRESS NOTES
Annette Nash is a 48year old male. Patient presents with:   Allergic Rhinitis: follow up, symptoms improved with medications      HISTORY OF PRESENT ILLNESS  He presents with a recent CT scan performed in the last 2 weeks.  States that he has prim daily basis but not using any other medications or sprays.  Breathing is overall improved despite acute sinus-like symptoms at this time.  No tobacco use.  Allergies?  I do not suspect he has had any previous allergy testing.  No other signs, symptoms or c Socioeconomic History      Marital status:       Spouse name: Not on file      Number of children: Not on file      Years of education: Not on file      Highest education level: Not on file    Tobacco Use      Smoking status: Never Smoker      Smoke 6/7/2018   • Paroxysmal atrial fibrillation ([facility] [address] 2013   • Sleep apnea     uses CPAP   • Torn meniscus     LEFT LEG   • Vitamin D deficiency 6/7/2018       Past Surgical History:   Procedure Laterality Date   • COLONOSCOPY     • EXCISION TURBINATE,SUBMUCOU Oropharynx - Normal.   Ears Normal Inspection - Right: Normal, Left: Normal. Canal - Right: Normal, Left: Normal. TM - Right: Normal, Left: Normal.   Skin Normal Inspection - Normal.        Lymph Detail Normal Submental. Submandibular.  Anterior cervical. P TAKE 1 TABLET BY MOUTH EVERY DAY (Patient not taking: Reported on 3/26/2021), Disp: 30 tablet, Rfl: 0  ASSESSMENT AND PLAN    1.  Allergic rhinitis, unspecified seasonality, unspecified trigger  Doing very well on loratadine and Singulair glycopyrrolate Ast

## 2021-03-29 RX ORDER — ALBUTEROL SULFATE 90 UG/1
AEROSOL, METERED RESPIRATORY (INHALATION)
Qty: 25.5 G | Refills: 3 | Status: SHIPPED | OUTPATIENT
Start: 2021-03-29

## 2021-04-02 ENCOUNTER — TELEPHONE (OUTPATIENT)
Dept: INTERNAL MEDICINE CLINIC | Facility: CLINIC | Age: 51
End: 2021-04-02

## 2021-04-02 DIAGNOSIS — E78.5 HYPERLIPIDEMIA, UNSPECIFIED HYPERLIPIDEMIA TYPE: ICD-10-CM

## 2021-04-02 DIAGNOSIS — E55.9 VITAMIN D DEFICIENCY: Primary | ICD-10-CM

## 2021-04-02 NOTE — TELEPHONE ENCOUNTER
Dr. Shilpa Galvez, please advise on orders. Last labs 8/2020. Thanks.     Future Appointments   Date Time Provider Jose Schofield   4/23/2021 10:00 AM Morgan Clark MD [facility]

## 2021-04-09 ENCOUNTER — LAB ENCOUNTER (OUTPATIENT)
Dept: LAB | Facility: HOSPITAL | Age: 51
End: 2021-04-09
Attending: INTERNAL MEDICINE
Payer: COMMERCIAL

## 2021-04-09 DIAGNOSIS — E78.5 HYPERLIPIDEMIA, UNSPECIFIED HYPERLIPIDEMIA TYPE: ICD-10-CM

## 2021-04-09 DIAGNOSIS — E55.9 VITAMIN D DEFICIENCY: ICD-10-CM

## 2021-04-09 PROCEDURE — 80053 COMPREHEN METABOLIC PANEL: CPT

## 2021-04-09 PROCEDURE — 80061 LIPID PANEL: CPT

## 2021-04-09 PROCEDURE — 36415 COLL VENOUS BLD VENIPUNCTURE: CPT

## 2021-04-09 PROCEDURE — 82306 VITAMIN D 25 HYDROXY: CPT

## 2021-04-09 PROCEDURE — 84443 ASSAY THYROID STIM HORMONE: CPT

## 2021-04-09 PROCEDURE — 85025 COMPLETE CBC W/AUTO DIFF WBC: CPT

## 2021-04-09 PROCEDURE — 82607 VITAMIN B-12: CPT

## 2021-04-22 RX ORDER — GLYCOPYRROLATE 2 MG/1
TABLET ORAL
Qty: 90 TABLET | Refills: 0 | Status: SHIPPED | OUTPATIENT
Start: 2021-04-22 | End: 2021-05-05

## 2021-04-23 ENCOUNTER — OFFICE VISIT (OUTPATIENT)
Dept: INTERNAL MEDICINE CLINIC | Facility: CLINIC | Age: 51
End: 2021-04-23
Payer: COMMERCIAL

## 2021-04-23 VITALS
HEART RATE: 82 BPM | SYSTOLIC BLOOD PRESSURE: 148 MMHG | DIASTOLIC BLOOD PRESSURE: 76 MMHG | TEMPERATURE: 97 F | HEIGHT: 73 IN | BODY MASS INDEX: 41.75 KG/M2 | WEIGHT: 315 LBS | RESPIRATION RATE: 20 BRPM

## 2021-04-23 DIAGNOSIS — I27.20 PULMONARY HTN (HCC): ICD-10-CM

## 2021-04-23 DIAGNOSIS — E66.01 OBESITY, MORBID (HCC): ICD-10-CM

## 2021-04-23 DIAGNOSIS — E55.9 VITAMIN D DEFICIENCY: ICD-10-CM

## 2021-04-23 DIAGNOSIS — Z00.00 ROUTINE PHYSICAL EXAMINATION: Primary | ICD-10-CM

## 2021-04-23 DIAGNOSIS — G47.33 OBSTRUCTIVE SLEEP APNEA SYNDROME: ICD-10-CM

## 2021-04-23 DIAGNOSIS — Z86.16 HISTORY OF 2019 NOVEL CORONAVIRUS DISEASE (COVID-19): ICD-10-CM

## 2021-04-23 DIAGNOSIS — Z12.5 PROSTATE CANCER SCREENING: ICD-10-CM

## 2021-04-23 DIAGNOSIS — I48.0 PAROXYSMAL ATRIAL FIBRILLATION (HCC): ICD-10-CM

## 2021-04-23 DIAGNOSIS — M89.9 LYTIC LESION OF BONE ON X-RAY: ICD-10-CM

## 2021-04-23 PROBLEM — R09.02 HYPOXIA: Status: RESOLVED | Noted: 2020-04-01 | Resolved: 2021-04-23

## 2021-04-23 PROBLEM — M89.8X9 LYTIC LESION OF BONE ON X-RAY: Status: ACTIVE | Noted: 2021-03-05

## 2021-04-23 PROCEDURE — 3078F DIAST BP <80 MM HG: CPT | Performed by: INTERNAL MEDICINE

## 2021-04-23 PROCEDURE — 99396 PREV VISIT EST AGE 40-64: CPT | Performed by: INTERNAL MEDICINE

## 2021-04-23 PROCEDURE — 3008F BODY MASS INDEX DOCD: CPT | Performed by: INTERNAL MEDICINE

## 2021-04-23 PROCEDURE — 3077F SYST BP >= 140 MM HG: CPT | Performed by: INTERNAL MEDICINE

## 2021-04-23 RX ORDER — FLUTICASONE PROPIONATE AND SALMETEROL 50; 100 UG/1; UG/1
POWDER RESPIRATORY (INHALATION)
COMMUNITY
Start: 2021-04-08

## 2021-04-23 NOTE — ASSESSMENT & PLAN NOTE
History of pulmonary hypertension. He has been on the CPAP and has been using it on a regular basis. His pressures had dropped from 56-37. Repeat echocardiogram in September as ordered.

## 2021-04-23 NOTE — ASSESSMENT & PLAN NOTE
Body mass index is 42.88 kg/m².    Wt Readings from Last 6 Encounters:  04/23/21 : (!) 325 lb (147.4 kg)  03/26/21 : (!) 325 lb (147.4 kg)  03/05/21 : (!) 339 lb (153.8 kg)  02/26/21 : (!) 341 lb (154.7 kg)  01/22/21 : (!) 341 lb 12.8 oz (155 kg)  01/19/21

## 2021-04-23 NOTE — ASSESSMENT & PLAN NOTE
History of COVID-19 infection related with bilateral infiltrates in the lung, hypoxia. He has recovered basically without any significant shortness of breath, cough. He did not develop any Covid associated coagulopathy.   Advised to continue on baby aspir

## 2021-04-23 NOTE — ASSESSMENT & PLAN NOTE
History of paroxysmal atrial fibrillation. No recent palpitations or shortness of breath. Repeat EKG has been ordered.

## 2021-04-23 NOTE — PROGRESS NOTES
HPI:   Taylor Kemp is a 48year old male who presents for an Annual Health Visit.          Allergies:   No Known Allergies    CURRENT MEDICATIONS   Current Outpatient Medications   Medication Sig Dispense Refill   • GLYCOPYRROLATE 2 MG Oral Tab • History of diverticulitis    • Hypercholesterolemia 2013   • Morbid obesity due to excess calories ([facility] [address]    • Obstructive sleep apnea syndrome 6/7/2018   • [facility] 2013   • Sleep apnea     uses CPAP   • Torn meniscus     L (155 kg)  01/19/21 : (!) 335 lb (152 kg)    Body mass index is 42.88 kg/m². Physical Exam  Vitals and nursing note reviewed. Constitutional:       Appearance: He is well-developed. HENT:      Head: Normocephalic and atraumatic.       Right Ear: Exter Tendon Reflexes: Reflexes are normal and symmetric. Psychiatric:         Behavior: Behavior normal.         Thought Content:  Thought content normal.         Judgment: Judgment normal.          ASSESSMENT AND PLAN:   Raulito Yo was seen today for physical.    D lb (147.4 kg)  03/05/21 : (!) 339 lb (153.8 kg)  02/26/21 : (!) 341 lb (154.7 kg)  01/22/21 : (!) 341 lb 12.8 oz (155 kg)  01/19/21 : (!) 335 lb (152 kg)  Gradual weight loss. Patient is advised to watch his diet for fatty foods, fried foods and desserts. over-the-counter vitamin D at 2000 units daily. Return in about 4 months (around 8/23/2021), or if symptoms worsen or fail to improve. The patient indicates understanding of these issues and agrees to the plan.     Problem List:  Patient

## 2021-04-23 NOTE — PATIENT INSTRUCTIONS
Problem List Items Addressed This Visit        Unprioritized    History of 2019 novel coronavirus disease (COVID-19)     History of COVID-19 infection related with bilateral infiltrates in the lung, hypoxia.   He has recovered basically without any signific on the CPAP and has been using it on a regular basis. His pressures had dropped from 56-37. Repeat echocardiogram in September as ordered.            Relevant Orders    CARD ECHO 2D DOPPLER (CPT=93306)    Routine physical examination - Primary     Normal

## 2021-04-23 NOTE — ASSESSMENT & PLAN NOTE
Normal exam.  Labs as ordered. Skin check–normal  No cervical, axillary, inguinal lymphadenopathy. Hernial orifices intact. Rectal exam normal,prostate normal to palpation. Small hemorrhoids present.  guaic negetive brown stools.   Colonoscopy due on 06

## 2021-04-23 NOTE — ASSESSMENT & PLAN NOTE
NAINA on CPAP. Has been able to use his CPAP as recommended but recently has not been sleeping deep enough and hence takes off his CPAP.   He has had a history of nasal turbinate reduction surgery in the past.  Will reassess needs after further evaluation of

## 2021-04-23 NOTE — ASSESSMENT & PLAN NOTE
History of  lytic focus of the L3 vertebra–incidentally noted and may represent a hemangioma. There are no old films to follow-up. Globulin electrophoresis has been requested.   Advised to repeat an MRI of the lumbar spine orders provided

## 2021-04-27 ENCOUNTER — ORDER TRANSCRIPTION (OUTPATIENT)
Dept: ADMINISTRATIVE | Facility: HOSPITAL | Age: 51
End: 2021-04-27

## 2021-04-27 DIAGNOSIS — Z13.6 SCREENING FOR CARDIOVASCULAR CONDITION: Primary | ICD-10-CM

## 2021-05-05 ENCOUNTER — TELEPHONE (OUTPATIENT)
Dept: OTOLARYNGOLOGY | Facility: CLINIC | Age: 51
End: 2021-05-05

## 2021-05-05 RX ORDER — GLYCOPYRROLATE 2 MG/1
2 TABLET ORAL 3 TIMES DAILY
Qty: 270 TABLET | Refills: 0 | Status: SHIPPED | OUTPATIENT
Start: 2021-05-05 | End: 2021-07-19

## 2021-05-05 RX ORDER — MULTIVIT-MIN/IRON/FOLIC ACID/K 18-600-40
1 CAPSULE ORAL 2 TIMES DAILY
Qty: 60 CAPSULE | Refills: 5 | Status: SHIPPED | OUTPATIENT
Start: 2021-05-05

## 2021-05-05 RX ORDER — FLUTICASONE PROPIONATE 50 MCG
1 SPRAY, SUSPENSION (ML) NASAL 2 TIMES DAILY
Qty: 3 BOTTLE | Refills: 0 | Status: SHIPPED | OUTPATIENT
Start: 2021-05-05 | End: 2021-08-04

## 2021-05-05 RX ORDER — AZELASTINE 1 MG/ML
SPRAY, METERED NASAL
Qty: 3 BOTTLE | Refills: 0 | Status: SHIPPED | OUTPATIENT
Start: 2021-05-05 | End: 2021-07-01

## 2021-05-05 NOTE — TELEPHONE ENCOUNTER
Per pt needs fluticasone, azelastine, montelukast and [facility]copy[facility]rolate [facility] Please advise thank you.

## 2021-05-26 ENCOUNTER — HOSPITAL ENCOUNTER (OUTPATIENT)
Dept: CT IMAGING | Facility: HOSPITAL | Age: 51
Discharge: HOME | End: 2021-05-26
Attending: INTERNAL MEDICINE

## 2021-05-26 DIAGNOSIS — Z13.6 SCREENING FOR CARDIOVASCULAR CONDITION: ICD-10-CM

## 2021-06-02 ENCOUNTER — HOSPITAL ENCOUNTER (OUTPATIENT)
Dept: MRI IMAGING | Facility: HOSPITAL | Age: 51
Discharge: HOME | End: 2021-06-02
Attending: INTERNAL MEDICINE
Payer: COMMERCIAL

## 2021-06-02 DIAGNOSIS — M89.9 LYTIC LESION OF BONE ON X-RAY: ICD-10-CM

## 2021-06-02 PROCEDURE — A9575 INJ GADOTERATE MEGLUMI 0.1ML: HCPCS | Performed by: INTERNAL MEDICINE

## 2021-06-02 PROCEDURE — 72158 MRI LUMBAR SPINE W/O & W/DYE: CPT | Performed by: INTERNAL MEDICINE

## 2021-07-01 RX ORDER — AZELASTINE 1 MG/ML
SPRAY, METERED NASAL
Qty: 90 ML | Refills: 0 | Status: SHIPPED | OUTPATIENT
Start: 2021-07-01 | End: 2021-09-15

## 2021-07-16 NOTE — TELEPHONE ENCOUNTER
This refill request is being sent to the provider for the following reason:  []Patient has not had an appointment within the past 12 months but has made an appointment on: ___  [x]Medication is not within protocol  []Patient did not complete follow up berenice

## 2021-07-19 RX ORDER — GLYCOPYRROLATE 2 MG/1
TABLET ORAL
Qty: 270 TABLET | Refills: 3 | Status: SHIPPED | OUTPATIENT
Start: 2021-07-19 | End: 2021-11-05

## 2021-08-04 RX ORDER — FLUTICASONE PROPIONATE 50 MCG
SPRAY, SUSPENSION (ML) NASAL
Qty: 48 G | Refills: 3 | Status: SHIPPED | OUTPATIENT
Start: 2021-08-04

## 2021-08-21 ENCOUNTER — HOSPITAL ENCOUNTER (OUTPATIENT)
Dept: CV DIAGNOSTICS | Facility: HOSPITAL | Age: 51
Discharge: HOME | End: 2021-08-21
Attending: INTERNAL MEDICINE
Payer: COMMERCIAL

## 2021-08-21 ENCOUNTER — LAB ENCOUNTER (OUTPATIENT)
Dept: LAB | Facility: HOSPITAL | Age: 51
End: 2021-08-21
Attending: INTERNAL MEDICINE
Payer: COMMERCIAL

## 2021-08-21 DIAGNOSIS — M89.9 LYTIC LESION OF BONE ON X-RAY: ICD-10-CM

## 2021-08-21 DIAGNOSIS — Z12.5 PROSTATE CANCER SCREENING: ICD-10-CM

## 2021-08-21 DIAGNOSIS — I27.20 PULMONARY HTN (HCC): ICD-10-CM

## 2021-08-21 LAB
COMPLEXED PSA SERPL-MCNC: 1.59 NG/ML (ref ?–4)
ERYTHROCYTE [SEDIMENTATION RATE] IN BLOOD: 23 MM/HR
IGA SERPL-MCNC: 485 MG/DL (ref 70–312)
IGM SERPL-MCNC: 87 MG/DL (ref 43–279)
IMMUNOGLOBULIN PNL SER-MCNC: 1580 MG/DL (ref 791–1643)

## 2021-08-21 PROCEDURE — 85652 RBC SED RATE AUTOMATED: CPT

## 2021-08-21 PROCEDURE — 36415 COLL VENOUS BLD VENIPUNCTURE: CPT

## 2021-08-21 PROCEDURE — 93306 TTE W/DOPPLER COMPLETE: CPT | Performed by: INTERNAL MEDICINE

## 2021-08-21 PROCEDURE — 82784 ASSAY IGA/IGD/IGG/IGM EACH: CPT

## 2021-08-21 PROCEDURE — 84165 PROTEIN E-PHORESIS SERUM: CPT

## 2021-08-21 PROCEDURE — 86334 IMMUNOFIX E-PHORESIS SERUM: CPT

## 2021-08-21 PROCEDURE — 83883 ASSAY NEPHELOMETRY NOT SPEC: CPT

## 2021-08-23 LAB
KAPPA LC FREE SER-MCNC: 2.13 MG/DL (ref 0.33–1.94)
KAPPA LC FREE/LAMBDA FREE SER NEPH: 1.4 {RATIO} (ref 0.26–1.65)
LAMBDA LC FREE SERPL-MCNC: 1.51 MG/DL (ref 0.57–2.63)

## 2021-08-24 LAB
ALBUMIN SERPL ELPH-MCNC: 3.74 G/DL (ref 3.75–5.21)
ALBUMIN/GLOB SERPL: 1 {RATIO} (ref 1–2)
ALPHA1 GLOB SERPL ELPH-MCNC: 0.31 G/DL (ref 0.19–0.46)
ALPHA2 GLOB SERPL ELPH-MCNC: 0.81 G/DL (ref 0.48–1.05)
B-GLOBULIN SERPL ELPH-MCNC: 1.08 G/DL (ref 0.68–1.23)
GAMMA GLOB SERPL ELPH-MCNC: 1.56 G/DL (ref 0.62–1.7)
M PROTEIN MFR SERPL ELPH: 7.5 G/DL (ref 6.4–8.2)

## 2021-09-02 ENCOUNTER — OFFICE VISIT (OUTPATIENT)
Dept: FAMILY MEDICINE CLINIC | Facility: CLINIC | Age: 51
End: 2021-09-02
Payer: COMMERCIAL

## 2021-09-02 VITALS
WEIGHT: 315 LBS | TEMPERATURE: 98 F | DIASTOLIC BLOOD PRESSURE: 83 MMHG | BODY MASS INDEX: 41.75 KG/M2 | SYSTOLIC BLOOD PRESSURE: 132 MMHG | HEART RATE: 83 BPM | RESPIRATION RATE: 20 BRPM | OXYGEN SATURATION: 97 % | HEIGHT: 73 IN

## 2021-09-02 DIAGNOSIS — B35.4 TINEA CORPORIS: Primary | ICD-10-CM

## 2021-09-02 PROCEDURE — 3008F BODY MASS INDEX DOCD: CPT | Performed by: PHYSICIAN ASSISTANT

## 2021-09-02 PROCEDURE — 3075F SYST BP GE 130 - 139MM HG: CPT | Performed by: PHYSICIAN ASSISTANT

## 2021-09-02 PROCEDURE — 3079F DIAST BP 80-89 MM HG: CPT | Performed by: PHYSICIAN ASSISTANT

## 2021-09-02 PROCEDURE — 99202 OFFICE O/P NEW SF 15 MIN: CPT | Performed by: PHYSICIAN ASSISTANT

## 2021-09-02 RX ORDER — CLOTRIMAZOLE 1 %
CREAM (GRAM) TOPICAL
Qty: 24 G | Refills: 0 | Status: SHIPPED | OUTPATIENT
Start: 2021-09-02

## 2021-09-02 NOTE — PROGRESS NOTES
CHIEF COMPLAINT:   Patient presents with:  Skin Problem: 3 weeks rash on left forearm OTC cortizone 3xs daily          HPI:    Tracy Hummel is a 48year old male who presents for evaluation of a rash. Per patient rash started in the past 3  weeks. pm night prior and the remaining 2 L 6 hours prior to procedure. (Patient not taking: Reported on 4/23/2021 ) 4000 mL 0   • Pseudoephedrine HCl  MG Oral Tablet 12 Hr Take 1 tablet (120 mg total) by mouth every 12 (twelve) hours.  (Patient not taking: kg)   SpO2 97%   BMI 44.59 kg/m²   GENERAL: well developed, well nourished,in no apparent distress  SKIN: volar aspect of left forearm with large annular region with raised borders and central clearing c/w tinea  EYES:EOMI, conjunctiva are clear  LUNGS: Cl

## 2021-09-02 NOTE — PATIENT INSTRUCTIONS
Fungal Skin Infection (Tinea)  A fungal infection occurs when too much fungus grows on or in the body. Fungus normally lives on the skin in small amounts and does not cause harm. But when too much grows on the skin, it causes an infection.  This is also k clothing. · Don’t scratch the affected area. This can delay healing and may spread the infection. It can also cause a bacterial infection. · Keep your skin clean, but don’t wash the skin too much. This can irritate your skin.   · Keep in mind that it may develop the infection after being exposed. So, you may not figure out the exact cause.   It's spread through direct contact with:  · An infected person or animal  · Infected soil, or objects such as towels, clothing, and reynolds  Symptoms  At first you might brushes. · Avoid having your skin and feet wet or damp for long periods. · Wear clean, loose-fitting underwear.   Follow-up care  Follow up with your healthcare provider as advised by our staff if the rash does not improve after 10 days of treatment or if

## 2021-09-15 RX ORDER — AZELASTINE 1 MG/ML
SPRAY, METERED NASAL
Qty: 90 ML | Refills: 3 | Status: SHIPPED | OUTPATIENT
Start: 2021-09-15

## 2021-11-02 ENCOUNTER — OFFICE VISIT (OUTPATIENT)
Dept: OTOLARYNGOLOGY | Facility: CLINIC | Age: 51
End: 2021-11-02
Payer: COMMERCIAL

## 2021-11-02 VITALS — HEIGHT: 73 IN | BODY MASS INDEX: 41.75 KG/M2 | WEIGHT: 315 LBS

## 2021-11-02 DIAGNOSIS — J01.90 ACUTE NON-RECURRENT SINUSITIS, UNSPECIFIED LOCATION: Primary | ICD-10-CM

## 2021-11-02 PROCEDURE — 99213 OFFICE O/P EST LOW 20 MIN: CPT | Performed by: OTOLARYNGOLOGY

## 2021-11-02 PROCEDURE — 3008F BODY MASS INDEX DOCD: CPT | Performed by: OTOLARYNGOLOGY

## 2021-11-02 RX ORDER — AMOXICILLIN AND CLAVULANATE POTASSIUM 875; 125 MG/1; MG/1
1 TABLET, FILM COATED ORAL EVERY 12 HOURS
Qty: 20 TABLET | Refills: 0 | Status: SHIPPED | OUTPATIENT
Start: 2021-11-02

## 2021-11-02 NOTE — PROGRESS NOTES
Phan Meneses is a 46year old male. Patient presents with:  Sinus Problem: pt is here today for having sinus issues, he states his sinuses feel raw. he is not taking any sinus medication right now,.        HISTORY OF PRESENT ILLNESS  He presents wi change in temperature and environment.  Currently using Singulair on a daily basis but not using any other medications or sprays.  Breathing is overall improved despite acute sinus-like symptoms at this time.  No tobacco use.  Allergies?  I do not suspect other sites, symptoms or complaints at this time.      11/2/21 he feels her bra burning sensation intranasally bilaterally. This started just a few days ago. Still using Singulair loratadine fluticasone Astelin but no longer using glycopyrrolate.   Uses S Abdominal pain and diarrhea. Endocrine Negative Cold intolerance and heat intolerance. Neuro Negative Tremors. Psych Negative Anxiety and depression. Integumentary Negative Frequent skin infections, pigment change and rash.    Hema/Lymph Negative Ea Disp: 90 capsule, Rfl: 1  •  AZELASTINE HCL 0.1 % Nasal Solution, USE 2 SPRAYS IN EACH NOSTRIL TWICE A DAY AS DIRECTED, Disp: 90 mL, Rfl: 3  •  clotrimazole 1 % External Cream, Apply to affected area(s) BID until resolved., Disp: 24 g, Rfl: 0  •  FLUTICASO needed. This note was prepared using [employer] [facility] [employer] voice recognition dictation software. As a result errors may occur. When identified these errors have been corrected.  While every attempt is made to correct errors during dictation discrepancies ma

## 2021-11-05 ENCOUNTER — OFFICE VISIT (OUTPATIENT)
Dept: INTERNAL MEDICINE CLINIC | Facility: CLINIC | Age: 51
End: 2021-11-05
Payer: COMMERCIAL

## 2021-11-05 VITALS
WEIGHT: 315 LBS | HEIGHT: 73 IN | RESPIRATION RATE: 18 BRPM | BODY MASS INDEX: 41.75 KG/M2 | TEMPERATURE: 98 F | SYSTOLIC BLOOD PRESSURE: 121 MMHG | HEART RATE: 69 BPM | DIASTOLIC BLOOD PRESSURE: 72 MMHG

## 2021-11-05 DIAGNOSIS — G47.33 OBSTRUCTIVE SLEEP APNEA SYNDROME: ICD-10-CM

## 2021-11-05 DIAGNOSIS — J45.20 MILD INTERMITTENT ASTHMA WITHOUT COMPLICATION: ICD-10-CM

## 2021-11-05 DIAGNOSIS — Z86.16 HISTORY OF 2019 NOVEL CORONAVIRUS DISEASE (COVID-19): ICD-10-CM

## 2021-11-05 DIAGNOSIS — M89.9 LYTIC LESION OF BONE ON X-RAY: ICD-10-CM

## 2021-11-05 DIAGNOSIS — I27.20 PULMONARY HTN (HCC): ICD-10-CM

## 2021-11-05 DIAGNOSIS — R77.1 ELEVATED SERUM GLOBULIN LEVEL: Primary | ICD-10-CM

## 2021-11-05 DIAGNOSIS — E78.5 HYPERLIPIDEMIA, UNSPECIFIED HYPERLIPIDEMIA TYPE: ICD-10-CM

## 2021-11-05 DIAGNOSIS — J01.00 ACUTE MAXILLARY SINUSITIS, RECURRENCE NOT SPECIFIED: ICD-10-CM

## 2021-11-05 PROCEDURE — 3074F SYST BP LT 130 MM HG: CPT | Performed by: INTERNAL MEDICINE

## 2021-11-05 PROCEDURE — 99214 OFFICE O/P EST MOD 30 MIN: CPT | Performed by: INTERNAL MEDICINE

## 2021-11-05 PROCEDURE — 3008F BODY MASS INDEX DOCD: CPT | Performed by: INTERNAL MEDICINE

## 2021-11-05 PROCEDURE — 3078F DIAST BP <80 MM HG: CPT | Performed by: INTERNAL MEDICINE

## 2021-11-05 RX ORDER — PREDNISONE 1 MG/1
TABLET ORAL
Qty: 15 TABLET | Refills: 0 | Status: SHIPPED | OUTPATIENT
Start: 2021-11-05

## 2021-11-05 RX ORDER — MONTELUKAST SODIUM 10 MG/1
TABLET ORAL
Qty: 30 TABLET | Refills: 3 | Status: SHIPPED | OUTPATIENT
Start: 2021-11-05 | End: 2022-02-03

## 2021-11-05 NOTE — ASSESSMENT & PLAN NOTE
Mild asthma, doing quite well at this time. Has not needed any significant changes in medication. Continue on Singulair, Advair as directed. Albuterol if needed.

## 2021-11-05 NOTE — ASSESSMENT & PLAN NOTE
Pulmonary hypertension–pulmonary pressures have improved from 56 to about 37/38 at this time. Continue to monitor, repeat echocardiogram next year.

## 2021-11-05 NOTE — PATIENT INSTRUCTIONS
Problem List Items Addressed This Visit        Unprioritized    Acute sinusitis     Patient has been seen and evaluated per ENT–Dr. Erasmo Euceda and has been on Augmentin which he has tolerated well. He is advised to use a humidifier at home.   Drink plenty of f Continue to monitor, repeat echocardiogram next year.            Other Visit Diagnoses     Elevated serum globulin level    -  Primary    Relevant Orders    SED RATE, WESTERGREN (AUTOMATED)    PROTEIN ELECTROPHORESIS WITH ANNA & IGA,IGG,IGM, SERUM    Hyperl

## 2021-11-05 NOTE — ASSESSMENT & PLAN NOTE
Patient has been seen and evaluated per ENT–Dr. Dejuan Perdomo and has been on Augmentin which he has tolerated well. He is advised to use a humidifier at home. Drink plenty of fluids. Continue to use the azelastine and Flonase. Continue on Singulair.   May nee

## 2021-11-05 NOTE — ASSESSMENT & PLAN NOTE
History of COVID-19 infection with bilateral lung infiltrates and, hypoxia, recovered without any significant shortness of breath. Has been able to tolerate 1 hour of exercise on a treadmill. He exercises 3 times a week.   He did not develop any post Covi

## 2021-11-05 NOTE — ASSESSMENT & PLAN NOTE
NAINA, on CPAP, he has been benefited by use. He is advised to check with his company if this is on recall. Echocardiogram looks stable. Continue to monitor.

## 2021-11-05 NOTE — ASSESSMENT & PLAN NOTE
Lesion noted on the L3 vertebra–incidental on the CT scan was reviewed per MRI–hemangioma at this time. No interventions needed. Serum protein electrophoresis–polyclonal, being monitored.

## 2021-11-05 NOTE — PROGRESS NOTES
HPI:    Patient ID: Claudeen Mutton is a 46year old male. jonathan discussed    Sinus Problem    Breathing Problem  He complains of difficulty breathing. This is a recurrent problem. The current episode started more than 1 month ago.  The problem occu MORNING BEFORE A MEAL AS NEEDED (BREAKFAST) 90 capsule 1   • AZELASTINE HCL 0.1 % Nasal Solution USE 2 SPRAYS IN EACH NOSTRIL TWICE A DAY AS DIRECTED 90 mL 3   • clotrimazole 1 % External Cream Apply to affected area(s) BID until resolved.  24 g 0   • FLUTI Pulmonary:      Effort: Pulmonary effort is normal.      Breath sounds: Normal breath sounds. Abdominal:      General: Bowel sounds are normal. There is no distension. Palpations: Abdomen is soft. There is no mass. Tenderness:  There is no abd Mild asthma, doing quite well at this time. Has not needed any significant changes in medication. Continue on Singulair, Advair as directed. Albuterol if needed.            Relevant Medications    predniSONE 5 MG Oral Tab    Pulmonary HTN (HCC)     P Signed Prescriptions Disp Refills   • predniSONE 5 MG Oral Tab 15 tablet 0     Si TAB PO BID X 5 DAYS  AND THEN PO Q D X 5 DAYS       Imaging & Referrals:  None       #[id number]

## 2021-11-23 ENCOUNTER — OFFICE VISIT (OUTPATIENT)
Dept: OTOLARYNGOLOGY | Facility: CLINIC | Age: 51
End: 2021-11-23
Payer: COMMERCIAL

## 2021-11-23 VITALS — HEIGHT: 73 IN | BODY MASS INDEX: 41.75 KG/M2 | WEIGHT: 315 LBS

## 2021-11-23 DIAGNOSIS — J30.9 ALLERGIC RHINITIS, UNSPECIFIED SEASONALITY, UNSPECIFIED TRIGGER: Primary | ICD-10-CM

## 2021-11-23 PROCEDURE — 3008F BODY MASS INDEX DOCD: CPT | Performed by: OTOLARYNGOLOGY

## 2021-11-23 PROCEDURE — 99213 OFFICE O/P EST LOW 20 MIN: CPT | Performed by: OTOLARYNGOLOGY

## 2021-11-24 NOTE — PROGRESS NOTES
Caleb Corona is a 46year old male. Patient presents with: Follow - Up: Pt is here for f/u says he still feels like he has alot of sinus conjestion , Pt was prescribed prednisone from pcp, which has helped  and fluticasone .        HISTORY OF PRES seem to have worsened with change in temperature and environment.  Currently using Singulair on a daily basis but not using any other medications or sprays.  Breathing is overall improved despite acute sinus-like symptoms at this time.  No tobacco use.  Al and feels very good.  No other sites, symptoms or complaints at this time.      11/2/21 he feels a burning sensation intranasally bilaterally. This started just a few days ago.   Still using Singulair loratadine fluticasone Astelin but no longer using glyc reflux disease)    • History of diverticulitis    • Hypercholesterolemia 2013   • Morbid obesity due to excess calories [facility])    • Obstructive sleep apnea syndrome 6/7/2018   • [facility]   • Sleep apnea     uses CPAP   • Tor Ears Normal Inspection - Right: Normal, Left: Normal. Canal - Right: Normal, Left: Normal. TM - Right: Normal, Left: Normal.   Skin Normal Inspection - Normal.        Lymph Detail Normal Submental. Submandibular.  Anterior cervical. Posterior cervical. Parker Tab, Take 1,000 mcg by mouth daily. , Disp: , Rfl:   •  Cholecalciferol (VITAMIN D3) 50 MCG (2000 UT) Oral Tab, Take 1 tablet by mouth daily. , Disp: , Rfl:   ASSESSMENT AND PLAN    1.  Allergic rhinitis, unspecified seasonality, unspecified trigger  No breat

## 2022-02-03 RX ORDER — MONTELUKAST SODIUM 10 MG/1
TABLET ORAL
Qty: 30 TABLET | Refills: 3 | Status: SHIPPED | OUTPATIENT
Start: 2022-02-03

## 2022-02-21 ENCOUNTER — TELEMEDICINE (OUTPATIENT)
Dept: INTERNAL MEDICINE CLINIC | Facility: CLINIC | Age: 52
End: 2022-02-21
Payer: COMMERCIAL

## 2022-02-21 DIAGNOSIS — G47.33 OBSTRUCTIVE SLEEP APNEA SYNDROME: ICD-10-CM

## 2022-02-21 DIAGNOSIS — I27.20 PULMONARY HTN (HCC): Primary | ICD-10-CM

## 2022-02-21 DIAGNOSIS — R91.1 LUNG NODULE < 6CM ON CT: ICD-10-CM

## 2022-02-21 DIAGNOSIS — E55.9 VITAMIN D DEFICIENCY: ICD-10-CM

## 2022-02-21 PROBLEM — IMO0001 LUNG NODULE < 6CM ON CT: Status: ACTIVE | Noted: 2022-02-21

## 2022-02-21 PROCEDURE — 99214 OFFICE O/P EST MOD 30 MIN: CPT | Performed by: INTERNAL MEDICINE

## 2022-02-21 NOTE — ASSESSMENT & PLAN NOTE
History of NAINA, has been on the CPAP, has been benefited by use. His echocardiogram does show persistent mild pulmonary hypertension. Will follow-up on echocardiogram with the end of this year.

## 2022-02-21 NOTE — ASSESSMENT & PLAN NOTE
Pulmonary hypertension, pulmonary pressures improved from 56-37/38. Continues to have some exertional dyspnea. No chest pain or palpitations. Repeat echocardiogram due in August 2022.

## 2022-04-02 ENCOUNTER — LAB ENCOUNTER (OUTPATIENT)
Dept: LAB | Facility: HOSPITAL | Age: 52
End: 2022-04-02
Attending: INTERNAL MEDICINE
Payer: COMMERCIAL

## 2022-04-02 DIAGNOSIS — R77.1 ELEVATED SERUM GLOBULIN LEVEL: ICD-10-CM

## 2022-04-02 DIAGNOSIS — E78.5 HYPERLIPIDEMIA, UNSPECIFIED HYPERLIPIDEMIA TYPE: ICD-10-CM

## 2022-04-02 LAB
ALBUMIN SERPL-MCNC: 3.4 G/DL (ref 3.4–5)
ALBUMIN/GLOB SERPL: 0.8 {RATIO} (ref 1–2)
ALP LIVER SERPL-CCNC: 113 U/L
ALT SERPL-CCNC: 31 U/L
ANION GAP SERPL CALC-SCNC: 6 MMOL/L (ref 0–18)
AST SERPL-CCNC: 20 U/L (ref 15–37)
BASOPHILS # BLD AUTO: 0.05 X10(3) UL (ref 0–0.2)
BASOPHILS NFR BLD AUTO: 0.6 %
BILIRUB SERPL-MCNC: 0.4 MG/DL (ref 0.1–2)
BUN BLD-MCNC: 13 MG/DL (ref 7–18)
BUN/CREAT SERPL: 11.1 (ref 10–20)
CALCIUM BLD-MCNC: 9.5 MG/DL (ref 8.5–10.1)
CHLORIDE SERPL-SCNC: 107 MMOL/L (ref 98–112)
CHOLEST SERPL-MCNC: 185 MG/DL (ref ?–200)
CO2 SERPL-SCNC: 29 MMOL/L (ref 21–32)
CREAT BLD-MCNC: 1.17 MG/DL
DEPRECATED RDW RBC AUTO: 47.3 FL (ref 35.1–46.3)
EOSINOPHIL # BLD AUTO: 0.24 X10(3) UL (ref 0–0.7)
EOSINOPHIL NFR BLD AUTO: 2.8 %
ERYTHROCYTE [DISTWIDTH] IN BLOOD BY AUTOMATED COUNT: 15.4 % (ref 11–15)
ERYTHROCYTE [SEDIMENTATION RATE] IN BLOOD: 25 MM/HR
FASTING PATIENT LIPID ANSWER: YES
FASTING STATUS PATIENT QL REPORTED: YES
GLOBULIN PLAS-MCNC: 4.2 G/DL (ref 2.8–4.4)
GLUCOSE BLD-MCNC: 97 MG/DL (ref 70–99)
HCT VFR BLD AUTO: 45.3 %
HDLC SERPL-MCNC: 35 MG/DL (ref 40–59)
HGB BLD-MCNC: 14.8 G/DL
IGA SERPL-MCNC: 497 MG/DL (ref 70–312)
IGM SERPL-MCNC: 97.2 MG/DL (ref 43–279)
IMM GRANULOCYTES # BLD AUTO: 0.02 X10(3) UL (ref 0–1)
IMM GRANULOCYTES NFR BLD: 0.2 %
IMMUNOGLOBULIN PNL SER-MCNC: 1580 MG/DL (ref 791–1643)
LDLC SERPL CALC-MCNC: 115 MG/DL (ref ?–100)
LYMPHOCYTES # BLD AUTO: 4.57 X10(3) UL (ref 1–4)
LYMPHOCYTES NFR BLD AUTO: 53.3 %
MCH RBC QN AUTO: 27.6 PG (ref 26–34)
MCHC RBC AUTO-ENTMCNC: 32.7 G/DL (ref 31–37)
MCV RBC AUTO: 84.5 FL
MONOCYTES # BLD AUTO: 0.68 X10(3) UL (ref 0.1–1)
MONOCYTES NFR BLD AUTO: 7.9 %
NEUTROPHILS # BLD AUTO: 3.02 X10 (3) UL (ref 1.5–7.7)
NEUTROPHILS # BLD AUTO: 3.02 X10(3) UL (ref 1.5–7.7)
NEUTROPHILS NFR BLD AUTO: 35.2 %
NONHDLC SERPL-MCNC: 150 MG/DL (ref ?–130)
OSMOLALITY SERPL CALC.SUM OF ELEC: 294 MOSM/KG (ref 275–295)
PLATELET # BLD AUTO: 447 10(3)UL (ref 150–450)
POTASSIUM SERPL-SCNC: 4.7 MMOL/L (ref 3.5–5.1)
PROT SERPL-MCNC: 7.6 G/DL (ref 6.4–8.2)
RBC # BLD AUTO: 5.36 X10(6)UL
SODIUM SERPL-SCNC: 142 MMOL/L (ref 136–145)
TRIGL SERPL-MCNC: 196 MG/DL (ref 30–149)
VLDLC SERPL CALC-MCNC: 34 MG/DL (ref 0–30)
WBC # BLD AUTO: 8.6 X10(3) UL (ref 4–11)

## 2022-04-02 PROCEDURE — 82784 ASSAY IGA/IGD/IGG/IGM EACH: CPT

## 2022-04-02 PROCEDURE — 85652 RBC SED RATE AUTOMATED: CPT

## 2022-04-02 PROCEDURE — 36415 COLL VENOUS BLD VENIPUNCTURE: CPT

## 2022-04-02 PROCEDURE — 85025 COMPLETE CBC W/AUTO DIFF WBC: CPT

## 2022-04-02 PROCEDURE — 83521 IG LIGHT CHAINS FREE EACH: CPT

## 2022-04-02 PROCEDURE — 80061 LIPID PANEL: CPT

## 2022-04-02 PROCEDURE — 80053 COMPREHEN METABOLIC PANEL: CPT

## 2022-04-02 PROCEDURE — 84165 PROTEIN E-PHORESIS SERUM: CPT

## 2022-04-02 PROCEDURE — 86334 IMMUNOFIX E-PHORESIS SERUM: CPT

## 2022-04-04 LAB
ALBUMIN SERPL ELPH-MCNC: 3.85 G/DL (ref 3.75–5.21)
ALBUMIN/GLOB SERPL: 1.03 {RATIO} (ref 1–2)
ALPHA1 GLOB SERPL ELPH-MCNC: 0.3 G/DL (ref 0.19–0.46)
ALPHA2 GLOB SERPL ELPH-MCNC: 0.87 G/DL (ref 0.48–1.05)
B-GLOBULIN SERPL ELPH-MCNC: 1.06 G/DL (ref 0.68–1.23)
GAMMA GLOB SERPL ELPH-MCNC: 1.5 G/DL (ref 0.62–1.7)
KAPPA LC FREE SER-MCNC: 2.5 MG/DL (ref 0.33–1.94)
KAPPA LC FREE/LAMBDA FREE SER NEPH: 1.47 {RATIO} (ref 0.26–1.65)
LAMBDA LC FREE SERPL-MCNC: 1.7 MG/DL (ref 0.57–2.63)
PROT SERPL-MCNC: 7.6 G/DL (ref 6.4–8.2)

## 2022-05-04 RX ORDER — MONTELUKAST SODIUM 10 MG/1
TABLET ORAL
Qty: 90 TABLET | Refills: 1 | Status: SHIPPED | OUTPATIENT
Start: 2022-05-04

## 2022-05-07 ENCOUNTER — OFFICE VISIT (OUTPATIENT)
Dept: OTOLARYNGOLOGY | Facility: CLINIC | Age: 52
End: 2022-05-07
Payer: COMMERCIAL

## 2022-05-07 VITALS — BODY MASS INDEX: 41.75 KG/M2 | WEIGHT: 315 LBS | HEIGHT: 73 IN

## 2022-05-07 DIAGNOSIS — J01.90 ACUTE NON-RECURRENT SINUSITIS, UNSPECIFIED LOCATION: Primary | ICD-10-CM

## 2022-05-07 PROCEDURE — 99213 OFFICE O/P EST LOW 20 MIN: CPT | Performed by: OTOLARYNGOLOGY

## 2022-05-07 PROCEDURE — 3008F BODY MASS INDEX DOCD: CPT | Performed by: OTOLARYNGOLOGY

## 2022-05-07 RX ORDER — PSEUDOEPHEDRINE HCL 120 MG/1
120 TABLET, FILM COATED, EXTENDED RELEASE ORAL EVERY 12 HOURS
Qty: 60 TABLET | Refills: 3 | Status: SHIPPED | OUTPATIENT
Start: 2022-05-07

## 2022-05-07 RX ORDER — METHYLPREDNISOLONE 4 MG/1
TABLET ORAL
Qty: 21 TABLET | Refills: 0 | Status: SHIPPED | OUTPATIENT
Start: 2022-05-07

## 2022-05-07 RX ORDER — AMOXICILLIN AND CLAVULANATE POTASSIUM 875; 125 MG/1; MG/1
1 TABLET, FILM COATED ORAL EVERY 12 HOURS
Qty: 28 TABLET | Refills: 0 | Status: SHIPPED | OUTPATIENT
Start: 2022-05-07

## 2022-05-10 ENCOUNTER — TELEPHONE (OUTPATIENT)
Dept: OTOLARYNGOLOGY | Facility: CLINIC | Age: 52
End: 2022-05-10

## 2022-05-10 NOTE — TELEPHONE ENCOUNTER
Dr. Flynn Diss, [place] and forgot to bring steroid pack prescribed 5/7/22. Asking to resend to pharmacy pended.

## 2022-05-10 NOTE — TELEPHONE ENCOUNTER
Pt called stating pt is out of state and forgot the steroid medication. Can rx. Be sent to the [facility]. Phone number [phone].

## 2022-05-15 RX ORDER — METHYLPREDNISOLONE 4 MG/1
TABLET ORAL
Qty: 21 TABLET | Refills: 0 | Status: SHIPPED | OUTPATIENT
Start: 2022-05-15

## 2022-07-22 RX ORDER — GLYCOPYRROLATE 2 MG/1
TABLET ORAL
Qty: 270 TABLET | Refills: 3 | Status: SHIPPED | OUTPATIENT
Start: 2022-07-22

## 2022-07-29 RX ORDER — FLUTICASONE PROPIONATE 50 MCG
SPRAY, SUSPENSION (ML) NASAL
Qty: 48 G | Refills: 3 | Status: SHIPPED | OUTPATIENT
Start: 2022-07-29

## 2022-09-30 RX ORDER — AZELASTINE HYDROCHLORIDE 137 UG/1
SPRAY, METERED NASAL
Qty: 90 ML | Refills: 3 | Status: SHIPPED | OUTPATIENT
Start: 2022-09-30

## 2022-10-12 ENCOUNTER — LAB ENCOUNTER (OUTPATIENT)
Dept: LAB | Facility: HOSPITAL | Age: 52
End: 2022-10-12
Attending: UROLOGY
Payer: COMMERCIAL

## 2022-10-12 DIAGNOSIS — R79.89 ELEVATED SERUM CREATININE: Primary | ICD-10-CM

## 2022-10-12 LAB
CREAT BLD-MCNC: 1.16 MG/DL
GFR SERPLBLD BASED ON 1.73 SQ M-ARVRAT: 76 ML/MIN/1.73M2 (ref 60–?)

## 2022-10-12 PROCEDURE — 36415 COLL VENOUS BLD VENIPUNCTURE: CPT

## 2022-10-12 PROCEDURE — 82565 ASSAY OF CREATININE: CPT

## 2022-11-07 RX ORDER — MONTELUKAST SODIUM 10 MG/1
TABLET ORAL
Qty: 90 TABLET | Refills: 0 | Status: SHIPPED | OUTPATIENT
Start: 2022-11-07

## 2022-11-15 ENCOUNTER — TELEPHONE (OUTPATIENT)
Dept: OTOLARYNGOLOGY | Facility: CLINIC | Age: 52
End: 2022-11-15

## 2022-11-15 NOTE — TELEPHONE ENCOUNTER
Patient requesting medication to be sent for his sinus issues. Patient currently scheduled for 11/21 and would like something prior to that appointment.  Please advise

## 2022-11-16 RX ORDER — AMOXICILLIN AND CLAVULANATE POTASSIUM 875; 125 MG/1; MG/1
1 TABLET, FILM COATED ORAL EVERY 12 HOURS
Qty: 20 TABLET | Refills: 0 | Status: SHIPPED | OUTPATIENT
Start: 2022-11-16

## 2022-11-16 NOTE — TELEPHONE ENCOUNTER
Per pt inside of nose feels raw inside and irritated, congestion-sinus pressure and pain, pt feels he cannot breathe through his nose at times. Pt used afrin last night. States this happens when he gets a sinus infection. Pt denies fever, or any other symptoms. Pt requesting an antibiotic. Please advise , last office visit on 05/07/22 for acute sinusitis.

## 2022-11-17 NOTE — TELEPHONE ENCOUNTER
MD Raul Escobar, RN; [facility] Clinical Staff 15 hours ago (8:34 PM)     Augmentin called into his pharmacy      Patient notified.      Future Appointments   Date Time Provider Jose Kanwal   11/21/2022  1:10 PM Raul Tobias MD [address] [facility]

## 2022-11-21 ENCOUNTER — OFFICE VISIT (OUTPATIENT)
Dept: OTOLARYNGOLOGY | Facility: CLINIC | Age: 52
End: 2022-11-21
Payer: COMMERCIAL

## 2022-11-21 VITALS — WEIGHT: 315 LBS | TEMPERATURE: 96 F | BODY MASS INDEX: 41.75 KG/M2 | HEIGHT: 73 IN

## 2022-11-21 DIAGNOSIS — J32.9 CHRONIC SINUSITIS, UNSPECIFIED LOCATION: Primary | ICD-10-CM

## 2022-11-21 RX ORDER — MONTELUKAST SODIUM 10 MG/1
10 TABLET ORAL NIGHTLY
Qty: 30 TABLET | Refills: 3 | Status: SHIPPED | OUTPATIENT
Start: 2022-11-21

## 2022-11-21 RX ORDER — MELOXICAM 15 MG/1
15 TABLET ORAL DAILY
COMMUNITY
Start: 2022-11-15

## 2022-11-21 RX ORDER — AZELASTINE 1 MG/ML
2 SPRAY, METERED NASAL 2 TIMES DAILY
Qty: 30 ML | Refills: 0 | Status: SHIPPED | OUTPATIENT
Start: 2022-11-21

## 2022-12-14 NOTE — TELEPHONE ENCOUNTER
Prophylactic NSAID therapy for Painful or Heavy menses     Ibuprofen or Naproxen (chose 1 or the other, do not take both), Dose as noted on the box  Typically Ibuprofen dose is 600 mg, (3 tablets) every 6-8 hours  Typically Naproxen dose is 500 mg every 12 hours  Start taking medication 2 days prior to onset of menses and continue taking through the first 3 days of menses  Make sure you take consistently this is important  You need to take with food to decrease any gastrointestinal upset effects    This is proven therapy to reduce you flow and cramping by 50 %    Life style changes that have a positive effect on painful and heavy periods are as follows   Daily physical exercise    Increase fiber, fresh fruits and vegetables in your diet    Increase daily water intake    Heating pads(do not apply directly to skin, apply over clothing or towel)   Warm Baths   Relaxation techniques, meditation, massage, yoga and mindfulness     These are all suggestion for improving your sense of frustrations with your menstrual cycle and improving your overall wellness and lifestyle        Intrauterine Device   AMBULATORY CARE:   An IUD  is a type of birth control that is inserted into your uterus  It is a small, flexible piece of plastic with a string on the end  It is inserted and removed by your healthcare provider  IUDs prevent sperm from reaching or fertilizing an egg  IUDs also prevent a fertilized egg from attaching to the uterus and developing into a fetus  Common types of IUDs:  Your healthcare provider will recommend the type of IUD that is right for you  This is based on your age and if you have had a child  If you have not had a child, a smaller IUD will be used  A copper IUD  slowly releases a small amount of copper into your uterus  This IUD can remain in place for up to 10 years  A hormone-releasing IUD  slowly releases a small amount of progesterone into your uterus   Progesterone is a hormone that is made Sent to [place] by your body to help control your periods  This IUD can remain in place for 3 to 5 years  Seek care immediately if:   You have severe pain or bleeding during your period  You have a fever and severe abdominal pain  Call your doctor or gynecologist if:   You think you are pregnant  The IUD has come out  You have bleeding from your vagina after you have sex, and it is not your period  You have pain during sex  You cannot feel the IUD string, the string feels longer, or you feel the plastic of the IUD itself  You have vaginal discharge that is green, yellow, or has a foul odor  You have questions or concerns about your condition or care  Advantages of an IUD:   An IUD is 98% to 99% effective in preventing pregnancy  The IUD can be removed by your healthcare provider if you decide to have a baby  You may be able to get pregnant as soon as the IUD is removed  An IUD protects you from pregnancy right after it is inserted  You do not have to stop sexual activity to insert it  You do not have to remember to take your birth control pill  Copper IUDs are safer for some women than oral birth control pills  Examples include women who smoke or have a history of blood clots  Hormone-releasing IUDs may decrease certain health problems  Examples include bleeding and cramping that happen with your monthly period  Disadvantages of an IUD:   There is a small chance that you could get pregnant  Sometimes the IUD cannot be removed after you get pregnant  This increases your risk of a miscarriage or an ectopic pregnancy  Ectopic pregnancy is when the fertilized egg starts to grow somewhere other than your uterus  An IUD does not protect you from sexually transmitted infections  You may have cramps during the first weeks after you get the IUD  A copper IUD may cause your monthly period to be heavier or more painful   This is more common within the first 3 months after you get the IUD  You may need to have your IUD removed if your bleeding or pain becomes severe  You may have spotting between periods  There is a small risk of an infection within the first 20 days after the IUD is placed  Infection can lead to pelvic inflammatory disease  This can cause infertility  Your uterus may tear when the IUD is inserted  The IUD may slip part or all of the way out of your uterus  Self-care:   NSAIDs , such as ibuprofen, help decrease swelling, pain, and fever  This medicine is available with or without a doctor's order  NSAIDs can cause stomach bleeding or kidney problems in certain people  If you take blood thinner medicine, always ask if NSAIDs are safe for you  Always read the medicine label and follow directions  Apply heat to relieve pain and cramping  Use a heating pad set on low  Apply heat to your lower abdomen for 20 minutes every hour, or as directed  Return to activities as directed  Your healthcare provider will tell you when it is okay to return to work, school, or other activities  Do not use a tampon or have sex  until your provider says it is okay  Make sure your IUD is in place: An IUD has a string that is made of plastic thread  One to 2 inches of this string hangs into your vagina  You cannot see this string, and it should not cause problems when you have sex  Check your IUD string every 3 days for the first 3 months that you have your IUD  After that, check the string after each monthly period  Do the following to check the placement of your IUD:  Wash your hands with soap and warm water  Dry them with a clean towel  Bend your knees and squat low to the ground  Gently put your index finger inside your vagina  The cervix is at the top of the vagina and feels like the tip of your nose  Feel for the IUD string  Do not pull on the string  You should not be able to feel the firm plastic of the IUD itself       Wash your hands after you check your IUD string  For more information:   [facility] [address] , [address] [place]  Phone: 5- 868 - 937-6589  Web Address: https://ME[employer]/  org    Follow up with your doctor as directed:  Write down your questions so you remember to ask them during your visits  © Copyright [address] 2022 Information is for End User's use only and may not be sold, redistributed or otherwise used for commercial purposes  All illustrations and images included in CareNotes® are the copyrighted property of [employer]  or [address] [place], [place] [address]kaci   The above information is an  only  It is not intended as medical advice for individual conditions or treatments  Talk to your doctor, nurse or pharmacist before following any medical regimen to see if it is safe and effective for you

## 2023-01-24 ENCOUNTER — APPOINTMENT (OUTPATIENT)
Dept: URBAN - METROPOLITAN AREA CLINIC 317 | Age: 53
Setting detail: DERMATOLOGY
End: 2023-01-24

## 2023-01-24 DIAGNOSIS — L30.9 DERMATITIS, UNSPECIFIED: ICD-10-CM

## 2023-01-24 PROCEDURE — 99214 OFFICE O/P EST MOD 30 MIN: CPT

## 2023-01-24 PROCEDURE — OTHER MIPS QUALITY: OTHER

## 2023-01-24 PROCEDURE — OTHER COUNSELING: OTHER

## 2023-01-24 PROCEDURE — OTHER PRESCRIPTION: OTHER

## 2023-01-24 PROCEDURE — OTHER PRESCRIPTION MEDICATION MANAGEMENT: OTHER

## 2023-01-24 RX ORDER — FLUOCINONIDE 0.5 MG/G
CREAM TOPICAL
Qty: 30 | Refills: 0 | Status: ERX | COMMUNITY
Start: 2023-01-24

## 2023-01-24 ASSESSMENT — LOCATION DETAILED DESCRIPTION DERM
LOCATION DETAILED: RIGHT ANTECUBITAL SKIN
LOCATION DETAILED: RIGHT PROXIMAL DORSAL FOREARM
LOCATION DETAILED: LEFT VENTRAL PROXIMAL FOREARM
LOCATION DETAILED: LEFT PROXIMAL DORSAL FOREARM

## 2023-01-24 ASSESSMENT — LOCATION ZONE DERM: LOCATION ZONE: ARM

## 2023-01-24 ASSESSMENT — LOCATION SIMPLE DESCRIPTION DERM
LOCATION SIMPLE: RIGHT ELBOW
LOCATION SIMPLE: RIGHT FOREARM
LOCATION SIMPLE: LEFT FOREARM

## 2023-01-24 NOTE — HPI: RASH
What Type Of Note Output Would You Prefer (Optional)?: Standard Output
How Severe Is Your Rash?: mild
Is This A New Presentation, Or A Follow-Up?: Rash
Additional History: Patient states that the areas were getting better but rash has returned and is flaring.

## 2023-01-24 NOTE — PROCEDURE: PRESCRIPTION MEDICATION MANAGEMENT
Detail Level: Zone
Initiate Treatment: Cerave cream BID \\nfluocinonide 0.05 % topical cream Apply to affected areas qd- bid x up to 2 weeks for major flares
Modify Regimen: Hold triamcinolone 0.1% topical cream, can use for minor flares when needed QD-BID for up to 2 weeks
Render In Strict Bullet Format?: No

## 2023-01-30 RX ORDER — MONTELUKAST SODIUM 10 MG/1
TABLET ORAL
Qty: 90 TABLET | Refills: 1 | Status: SHIPPED | OUTPATIENT
Start: 2023-01-30

## 2023-05-18 ENCOUNTER — LAB ENCOUNTER (OUTPATIENT)
Dept: LAB | Facility: HOSPITAL | Age: 53
End: 2023-05-18
Attending: ORTHOPAEDIC SURGERY
Payer: COMMERCIAL

## 2023-05-18 DIAGNOSIS — Z01.812 PRE-OPERATIVE LABORATORY EXAMINATION: Primary | ICD-10-CM

## 2023-05-18 DIAGNOSIS — Z01.812 PRE-OPERATIVE LABORATORY EXAMINATION: ICD-10-CM

## 2023-05-18 LAB
ALBUMIN SERPL-MCNC: 3.3 G/DL (ref 3.4–5)
ALBUMIN/GLOB SERPL: 0.7 {RATIO} (ref 1–2)
ALP LIVER SERPL-CCNC: 95 U/L
ALT SERPL-CCNC: 27 U/L
ANION GAP SERPL CALC-SCNC: 5 MMOL/L (ref 0–18)
AST SERPL-CCNC: 20 U/L (ref 15–37)
BASOPHILS # BLD AUTO: 0.06 X10(3) UL (ref 0–0.2)
BASOPHILS NFR BLD AUTO: 0.8 %
BILIRUB SERPL-MCNC: 0.4 MG/DL (ref 0.1–2)
BUN BLD-MCNC: 15 MG/DL (ref 7–18)
BUN/CREAT SERPL: 13.6 (ref 10–20)
CALCIUM BLD-MCNC: 9.7 MG/DL (ref 8.5–10.1)
CHLORIDE SERPL-SCNC: 108 MMOL/L (ref 98–112)
CO2 SERPL-SCNC: 26 MMOL/L (ref 21–32)
CREAT BLD-MCNC: 1.1 MG/DL
DEPRECATED RDW RBC AUTO: 45.2 FL (ref 35.1–46.3)
EOSINOPHIL # BLD AUTO: 0.13 X10(3) UL (ref 0–0.7)
EOSINOPHIL NFR BLD AUTO: 1.8 %
ERYTHROCYTE [DISTWIDTH] IN BLOOD BY AUTOMATED COUNT: 15 % (ref 11–15)
FASTING STATUS PATIENT QL REPORTED: NO
GFR SERPLBLD BASED ON 1.73 SQ M-ARVRAT: 81 ML/MIN/1.73M2 (ref 60–?)
GLOBULIN PLAS-MCNC: 4.6 G/DL (ref 2.8–4.4)
GLUCOSE BLD-MCNC: 91 MG/DL (ref 70–99)
HCT VFR BLD AUTO: 44.6 %
HGB BLD-MCNC: 14.7 G/DL
IMM GRANULOCYTES # BLD AUTO: 0.01 X10(3) UL (ref 0–1)
IMM GRANULOCYTES NFR BLD: 0.1 %
LYMPHOCYTES # BLD AUTO: 2.92 X10(3) UL (ref 1–4)
LYMPHOCYTES NFR BLD AUTO: 39.6 %
MCH RBC QN AUTO: 27.6 PG (ref 26–34)
MCHC RBC AUTO-ENTMCNC: 33 G/DL (ref 31–37)
MCV RBC AUTO: 83.7 FL
MONOCYTES # BLD AUTO: 0.52 X10(3) UL (ref 0.1–1)
MONOCYTES NFR BLD AUTO: 7 %
NEUTROPHILS # BLD AUTO: 3.74 X10 (3) UL (ref 1.5–7.7)
NEUTROPHILS # BLD AUTO: 3.74 X10(3) UL (ref 1.5–7.7)
NEUTROPHILS NFR BLD AUTO: 50.7 %
OSMOLALITY SERPL CALC.SUM OF ELEC: 288 MOSM/KG (ref 275–295)
PLATELET # BLD AUTO: 459 10(3)UL (ref 150–450)
POTASSIUM SERPL-SCNC: 4.3 MMOL/L (ref 3.5–5.1)
PROT SERPL-MCNC: 7.9 G/DL (ref 6.4–8.2)
RBC # BLD AUTO: 5.33 X10(6)UL
SODIUM SERPL-SCNC: 139 MMOL/L (ref 136–145)
WBC # BLD AUTO: 7.4 X10(3) UL (ref 4–11)

## 2023-05-18 PROCEDURE — 36415 COLL VENOUS BLD VENIPUNCTURE: CPT

## 2023-05-18 PROCEDURE — 85025 COMPLETE CBC W/AUTO DIFF WBC: CPT

## 2023-05-18 PROCEDURE — 80053 COMPREHEN METABOLIC PANEL: CPT

## 2023-05-18 PROCEDURE — 93010 ELECTROCARDIOGRAM REPORT: CPT | Performed by: INTERNAL MEDICINE

## 2023-05-18 PROCEDURE — 93005 ELECTROCARDIOGRAM TRACING: CPT

## 2023-05-19 LAB
ATRIAL RATE: 77 BPM
P AXIS: 53 DEGREES
P-R INTERVAL: 158 MS
Q-T INTERVAL: 364 MS
QRS DURATION: 98 MS
QTC CALCULATION (BEZET): 411 MS
R AXIS: -5 DEGREES
T AXIS: 25 DEGREES
VENTRICULAR RATE: 77 BPM

## 2023-07-27 RX ORDER — FLUTICASONE PROPIONATE 50 MCG
SPRAY, SUSPENSION (ML) NASAL
Qty: 48 G | Refills: 3 | Status: SHIPPED | OUTPATIENT
Start: 2023-07-27

## 2023-08-07 ENCOUNTER — OFFICE VISIT (OUTPATIENT)
Dept: OTOLARYNGOLOGY | Facility: CLINIC | Age: 53
End: 2023-08-07

## 2023-08-07 VITALS — HEIGHT: 73 IN | WEIGHT: 315 LBS | BODY MASS INDEX: 41.75 KG/M2

## 2023-08-07 DIAGNOSIS — J32.9 CHRONIC SINUSITIS, UNSPECIFIED LOCATION: Primary | ICD-10-CM

## 2023-08-07 DIAGNOSIS — J34.3 NASAL TURBINATE HYPERTROPHY: ICD-10-CM

## 2023-08-07 DIAGNOSIS — R05.2 SUBACUTE COUGH: ICD-10-CM

## 2023-08-07 PROCEDURE — 99213 OFFICE O/P EST LOW 20 MIN: CPT | Performed by: SPECIALIST

## 2023-08-07 PROCEDURE — 3008F BODY MASS INDEX DOCD: CPT | Performed by: SPECIALIST

## 2023-08-07 RX ORDER — PREDNISONE 20 MG/1
20 TABLET ORAL DAILY
Qty: 3 TABLET | Refills: 0 | Status: SHIPPED | OUTPATIENT
Start: 2023-08-07

## 2023-08-07 RX ORDER — CODEINE PHOSPHATE AND GUAIFENESIN 10; 100 MG/5ML; MG/5ML
5 SOLUTION ORAL EVERY 6 HOURS PRN
Qty: 150 ML | Refills: 0 | Status: SHIPPED | OUTPATIENT
Start: 2023-08-07 | End: 2023-08-14

## 2023-08-07 RX ORDER — CEFUROXIME AXETIL 500 MG/1
500 TABLET ORAL 2 TIMES DAILY
Qty: 28 TABLET | Refills: 0 | Status: SHIPPED | OUTPATIENT
Start: 2023-08-07

## 2023-08-07 NOTE — PATIENT INSTRUCTIONS
You were placed on Ceftin, and 3 days of prednisone for your sinusitis symptoms. Continue Astelin and Flonase nasal sprays. I also placed you on Cheratussin AC for the cough. Follow-up in 3 weeks time, sooner if problems. If you continue to have symptoms a CT of the sinuses will be recommended.

## 2023-09-14 ENCOUNTER — LAB ENCOUNTER (OUTPATIENT)
Dept: LAB | Facility: HOSPITAL | Age: 53
End: 2023-09-14
Attending: UROLOGY
Payer: COMMERCIAL

## 2023-09-14 DIAGNOSIS — N13.9 URINARY OBSTRUCTION: ICD-10-CM

## 2023-09-14 LAB
CREAT BLD-MCNC: 1.23 MG/DL
EGFRCR SERPLBLD CKD-EPI 2021: 71 ML/MIN/1.73M2 (ref 60–?)
PSA SERPL-MCNC: 2.25 NG/ML (ref ?–4)

## 2023-09-14 PROCEDURE — 84153 ASSAY OF PSA TOTAL: CPT

## 2023-09-14 PROCEDURE — 82565 ASSAY OF CREATININE: CPT

## 2023-09-14 PROCEDURE — 36415 COLL VENOUS BLD VENIPUNCTURE: CPT

## 2023-11-01 RX ORDER — AZELASTINE HYDROCHLORIDE 137 UG/1
SPRAY, METERED NASAL
Qty: 90 ML | Refills: 1 | Status: SHIPPED | OUTPATIENT
Start: 2023-11-01

## 2023-12-28 ENCOUNTER — LAB REQUISITION (OUTPATIENT)
Dept: LAB | Facility: HOSPITAL | Age: 53
End: 2023-12-28
Payer: COMMERCIAL

## 2023-12-28 DIAGNOSIS — N39.0 URINARY TRACT INFECTION, SITE NOT SPECIFIED: ICD-10-CM

## 2023-12-28 PROCEDURE — 87086 URINE CULTURE/COLONY COUNT: CPT | Performed by: UROLOGY

## 2024-01-08 ENCOUNTER — HOSPITAL ENCOUNTER (OUTPATIENT)
Dept: ULTRASOUND IMAGING | Age: 54
Discharge: HOME | End: 2024-01-08
Attending: UROLOGY
Payer: COMMERCIAL

## 2024-01-08 DIAGNOSIS — N50.9 DISORDER OF EPIDIDYMIS: ICD-10-CM

## 2024-01-08 DIAGNOSIS — N50.812 PAIN IN LEFT TESTICLE: ICD-10-CM

## 2024-01-08 PROCEDURE — 76870 US EXAM SCROTUM: CPT | Performed by: UROLOGY

## 2024-01-08 PROCEDURE — 93975 VASCULAR STUDY: CPT | Performed by: UROLOGY

## 2024-01-09 ENCOUNTER — HOSPITAL ENCOUNTER (OUTPATIENT)
Dept: CT IMAGING | Facility: HOSPITAL | Age: 54
Discharge: HOME | End: 2024-01-09
Attending: UROLOGY
Payer: COMMERCIAL

## 2024-01-09 DIAGNOSIS — R31.9 HEMATURIA: ICD-10-CM

## 2024-01-09 LAB
CREAT BLD-MCNC: 1.1 MG/DL
EGFRCR SERPLBLD CKD-EPI 2021: 80 ML/MIN/1.73M2 (ref 60–?)

## 2024-01-09 PROCEDURE — 74178 CT ABD&PLV WO CNTR FLWD CNTR: CPT | Performed by: UROLOGY

## 2024-01-09 PROCEDURE — 82565 ASSAY OF CREATININE: CPT

## 2024-01-09 PROCEDURE — 76377 3D RENDER W/INTRP POSTPROCES: CPT | Performed by: UROLOGY

## 2024-01-12 NOTE — H&P (VIEW-ONLY)
Alexis Greer is a 53 year old male.  History of microscopic and gross hematuria here for cystoscopy, bilateral retrograde pyelogram, possible biopsy.  HPI:   No chief complaint on file.    I saw Alexis in the office on 1/11/24. He is a 53-year-old male with history of microscopic hematuria, recent urinary tract infection, and left epididymoorchitis. Several weeks ago he noticed an episode of gross hematuria and had symptoms of a urinary tract infection after a flight. He then had left testicular pain. He also had microscopic hematuria on his visit 2 weeks ago. Since taking Bactrim his left testicle pain has resolved. They ultrasound of his scrotum done on 1/8/24 showed left epididymitis. A urine culture done at 12/28/23 negative. His creatinine on 1/9/24 was 1.1. His CT urogram done on 1/9/24 showed no urologic abnormalities. He had bronchiolitis which was unchanged since 2021. In our office he had a flow test which showed a peak flow rate of 9 mL per 2nd. He had an ultrasound of his pelvis which showed a slightly enlarged prostate and a postvoid residual of 37 mL. He's not had any further episodes of gross hematuria. His stream is variable but he declines medications for treatment of this. He denies flank or abdominal pain  History provided by Dr. Lopez    Principle Problem:   <principal problem not specified>    Active Problem:   Patient Active Problem List   Diagnosis    Obesity, morbid (HCC)    Paroxysmal atrial fibrillation (HCC)    Acute sinusitis    Diverticulosis    Gastroesophageal reflux disease, esophagitis presence not specified    Elevated blood pressure reading    Nasal congestion    Allergic rhinitis    Cough    Impingement syndrome of shoulder region    Knee pain    Obstructive sleep apnea syndrome    Vitamin D deficiency    Rectal bleeding    Mild intermittent asthma without complication    Pulmonary HTN (HCC)    History of 2019 novel coronavirus disease (COVID-19)    Palpitations     Diverticulitis    LUQ pain    Gastroesophageal reflux disease with esophagitis without hemorrhage    Lytic lesion of bone on x-ray    Routine physical examination    Lung nodule < 6cm on CT         HISTORY:  Past Medical History:   Diagnosis Date    Chronic sinusitis 2013    Deviated nasal septum     Diverticulosis     Elevated blood pressure reading 2015    GERD (gastroesophageal reflux disease)     History of diverticulitis     Hypercholesterolemia 2013    Morbid obesity due to excess calories (HCC)     Obstructive sleep apnea syndrome 6/7/2018    Paroxysmal atrial fibrillation (HCC) 2013    Sleep apnea     uses CPAP    Torn meniscus     LEFT LEG    Vitamin D deficiency 6/7/2018      Past Surgical History:   Procedure Laterality Date    COLONOSCOPY      EXCISION TURBINATE,SUBMUCOUS  11/25/2019    OTHER SURGICAL HISTORY Left 12/18/2019    left meniscal repair    REPAIR OF NASAL SEPTUM  11/25/2019      Family History   Problem Relation Age of Onset    Diabetes Brother     High Blood Pressure Brother     Heart Disorder Brother     Hypertension Brother     Hypertension Father     Other (Diverticulitis) Father       Social History:   Social History     Socioeconomic History    Marital status:    Tobacco Use    Smoking status: Never    Smokeless tobacco: Never   Vaping Use    Vaping Use: Never used   Substance and Sexual Activity    Alcohol use: Not Currently     Comment: 1-2x/month    Drug use: Not Currently     Types: Cannabis     Comment: last use - 2018        Medications :  See nurses notes  singular multiple inhalers and in nasal sprays.    Allergies:  No Known Allergies      ROS:   CONSTITUTIONAL: denies fever, chills  ENT: denies sore throat, nasal drainage/congestion  CHEST/CVS: denies cough, sputum, trouble breathing/SOB, chest pain, LARSON,     He hasSleep apnea, gastroesophageal reflux disease, sinus problems,    GI: denies abdominal pain, heartburn, diarrhea, blood in bm, tarry bm, constipation,    :  denies difficulty urinating, pain, blood in urine, or frequency  SKIN: denies any unusual skin lesions or rashes  MUSCULOSKELETAL: denies back pain, joint pain, leg swelling  NEURO/EYES: denies headaches, passing out, motor dysfunction, difficulty walking, difficulty with speech, temporary blindness, double vision, confusion  Hematologic/lymphatic: negative  Behavioral/Psych: negative  Endocrine: negative  Allergic/Immunologic:negative    PHYSICAL EXAM:     Blood pressures 120/80, pulse 60, registry rate 16, temperature 97.2      GENERAL: alert and orientated X 3, well developed, well nourished, in no apparent distress  HEENT: ears and throat are clear, eyes normal, nose normal, mouth normal  NECK: supple, no lymphadenopathy, thyroid wnl, neck normal  RESPIRATORY: no rales, rhonchi, or wheezes B, lungs clear  CARDIO: RRR without murmur, no murmur, no gallop, normal S1 and S2  ABDOMEN: soft, non-tender with no palpable aneurysm or masses, liver and spleen normal  BACK: normal, no tenderness  FLANK: normal  SKIN: no rashes, no suspicious lesions, warm and dry  EXTREMITIES: no edema, full range of motion, no tenderness, pulse normal  NEURO/PSYCH: orientated x3, normal mood and affect, no sensory or motor deficit, muscle strength normal  : penis normal, testicles normal, With some mild hardness of the left epididymis which is less than last exam. His left testicle and epididymis are nontender now.scrotum normal, perineum normal    Laboratory Data:  No results for input(s): \"URINE\", \"CULTI\", \"BLDSMR\" in the last 168 hours.    Chemistries:  Lab Results   Component Value Date    CREATSERUM 1.23 09/14/2023       CrCl cannot be calculated (Patient's most recent lab result is older than the maximum 7 days allowed.).    Lab Results   Component Value Date    BUN 15 05/18/2023     05/18/2023    K 4.3 05/18/2023     05/18/2023    CO2 26.0 05/18/2023    GLU 91 05/18/2023    CA 9.7 05/18/2023    ALB 3.3 (L) 05/18/2023     ALKPHO 95 05/18/2023    TP 7.9 05/18/2023    AST 20 05/18/2023    ALT 27 05/18/2023         Hematology:  Lab Results   Component Value Date    WBC 7.4 05/18/2023    HGB 14.7 05/18/2023    HCT 44.6 05/18/2023    .0 (H) 05/18/2023         Coags:  No results found for: \"PTT\", \"INR\", \"DDIMER\"      Lab Results   Component Value Date    PSA 2.25 09/14/2023           No results for input(s): \"COLORUR\", \"CLARITY\", \"SPECGRAVITY\", \"GLUUR\", \"BILUR\", \"KETUR\", \"BLOODURINE\", \"PHURINE\", \"PROUR\", \"UROBILINOGEN\", \"NITRITE\", \"LEUUR\", \"WBCUR\", \"RBCUR\", \"BACUR\", \"EPIUR\" in the last 168 hours.    No results for input(s): \"URINE\", \"CULTI\", \"BLDSMR\" in the last 168 hours.       Imaging:    CT UROGRAM(W+WO) W/3D(CPT=74178/03016)    Result Date: 1/9/2024  PROCEDURE: CT UROGRAM(W+WO) W/3D (CPT=74178/00345)  COMPARISON: [facility], CT CALCIUM SCORING OVER READ, 5/26/2021, 5:35 PM.  INDICATIONS: Hematuria, recent UTI  TECHNIQUE:   CT images were created of the abdomen and pelvis without and with intravenous non-ionic contrast followed by multiplane 3d MIP imaging and 3D volumetric imaging of delays were performed for a CT urogram.  Dose reduction techniques were used.  Dose information is transmitted to the ACR (American College of Radiology) NRDR (National Radiology Data Registry) which includes the Dose Index Registry.  3-D RENDERING:Three dimensional image processing was completed using a separate workstation under concurrent supervision. Images were archived.  FINDINGS:  KIDNEYS AND URINARY TRACT:  RIGHT: No renal calculus or hydronephrosis. No enhancing renal lesion. The renal collecting systems and ureters are intact without mass, stricture or filling defects.  LEFT: No renal calculus or hydronephrosis. No enhancing renal lesion. The renal collecting systems and ureters are intact without mass, stricture or filling defects.  BLADDER: No wall thickening, calculus, or mass. No pericystic inflammation.    ADRENALS:   The adrenal glands are unremarkable. LIVER: No focal hepatic lesions are identified. GALLBLADDER: The gallbladder is unremarkable. BILIARY: There is no intra-or extrahepatic biliary duct dilation. SPLEEN: The spleen is unremarkable. PANCREAS: There are no pancreatic masses or pancreatic ductal dilation. AORTA/VASCULAR:   Normal.  No aneurysm or dissection. RETROPERITONEUM: No mass or enlarged adenopathy.  BOWEL/MESENTERY:  There is diverticulosis involving the distal descending colon and sigmoid colon without evidence of diverticulitis. There is no dilation or wall thickening. The appendix is normal. There are no inflammatory changes within the right lower quadrant.  No mass or loculated collection. PELVIS: No enlarged mass or adenopathy.   BONES:   Within the L3 vertebral body, there is a lucent lesion with internal trabeculations consistent with a benign hemangioma.  No cortical breakthrough or adjacent periosteal reaction or aggressive features are seen. There is degenerative disease of the thoracic and lumbar spine. Degenerative changes are seen within the sacroiliac joints and pubic symphysis. Degenerative changes are seen in the bilateral hips. LUNG BASES: There is bibasilar and lingular atelectasis and scarring.  Ill-defined tree-in-bud opacities are seen within the bilateral lower lobes which are unchanged since 5/26/2021          CONCLUSION:   No renal calculus, enhancing renal lesion, hydronephrosis or filling defects within the collecting systems. If there is continued hematuria, direct visualization could be performed per clinical discretion.   Diverticulosis without diverticulitis.  Chronic appearing infectious bronchiolitis involving the bilateral lower lobes which is unchanged since 5/26/2021.  Multiple other incidental findings as described in the body of the report.     Dictated by (CST): Noah Vanessa MD on 1/09/2024 at 2:11 PM     Finalized by (CST): Noah Vanessa MD on 1/09/2024 at 2:23  PM          US SCROTUM W/ DOPPLER (CPT=93975/58418)    Result Date: 1/8/2024  PROCEDURE: US SCROTUM W/DOPPLER (CPT=93975/90055)  COMPARISON: None.  INDICATIONS: Disorder of epididymis;  Pain in left testicle  TECHNIQUE: The scrotum was evaluated with gray scale and color duplex Doppler sonography.  FINDINGS:   RIGHT TESTICLE: 3.4 x 2.0 x 2.3 cm.  Normal echogenicity.  No masses.  EPIDIDYMIS: Normal size and echogenicity.  There is a 4 mm epididymal head cyst. OTHER: Trace right hydrocele.  No varicocele. DOPPLER: Normal arterial and venous flow in the testicle with color and pulsed Doppler.  Normal epididymal flow.   LEFT TESTICLE: 3.2 x 2.1 x 2.6 cm.  Normal echogenicity.  No masses.  EPIDIDYMIS: The left epididymis is enlarged and heterogeneous in echogenicity. There is a 7 x 6 x 6 mm cystic structure superior to the left epididymis, which likely reflects a cystic epididymal appendix. OTHER: Small left hydrocele with internal echogenic debris. There is a small left varicocele. DOPPLER: Normal arterial and venous flow in the testicle with color pulsed Doppler.  There is increased Doppler flow to the left epididymis.         CONCLUSION:   Left epididymitis with an associated small reactive left hydrocele.  No testicular torsion, orchitis, or testicular mass.  Trace right hydrocele.  Small left varicocele.    Dictated by (CST): Pietro Garcia MD on 1/08/2024 at 3:13 PM     Finalized by (CST): Pietro Garcia MD on 1/08/2024 at 3:21 PM              Review of previous records: reviewed- if available        ASSESSMENT/PLAN:   Assessment     My assessment is he has a mild obstructive uropathy and may have a component of a neurogenic bladder. He had a recent episode of left epididymoorchitis. He has microscopic and a history of gross hematuria. He has nocturia.  Principle Problem:   <principal problem not specified>    Active Problem:   Patient Active Problem List   Diagnosis    Obesity, morbid (HCC)    Paroxysmal atrial  fibrillation (HCC)    Acute sinusitis    Diverticulosis    Gastroesophageal reflux disease, esophagitis presence not specified    Elevated blood pressure reading    Nasal congestion    Allergic rhinitis    Cough    Impingement syndrome of shoulder region    Knee pain    Obstructive sleep apnea syndrome    Vitamin D deficiency    Rectal bleeding    Mild intermittent asthma without complication    Pulmonary HTN (HCC)    History of 2019 novel coronavirus disease (COVID-19)    Palpitations    Diverticulitis    LUQ pain    Gastroesophageal reflux disease with esophagitis without hemorrhage    Lytic lesion of bone on x-ray    Routine physical examination    Lung nodule < 6cm on CT       RECOMMENDATIONS AND TREATMENT PLAN:      We will schedule him for a cystoscopy, bilateral retrograde pyelogram, and possible biopsy. He will get Duricef 500 mg 2 hours prior to the procedure. He will then see me in 3 months for general follow-up. The patient was counseled to limit evening fluid intake, stop fluid intake 3 hours prior to bedtime as well as void at bedtime. Also, if there is any swelling in the legs, the lower extremities should be elevated starting a few hours before bedtime. He will drink more water, less bladder irritants and do time voiding.  I explained to him the procedure, risks, and complications. He understands and accepts and desires us to proceed.           LILA HERMOSILLO MD      Review of previous records: reviewed- if available

## 2024-01-18 ENCOUNTER — APPOINTMENT (OUTPATIENT)
Dept: ULTRASOUND IMAGING | Facility: HOSPITAL | Age: 54
End: 2024-01-18
Attending: EMERGENCY MEDICINE
Payer: COMMERCIAL

## 2024-01-18 ENCOUNTER — HOSPITAL ENCOUNTER (EMERGENCY)
Facility: HOSPITAL | Age: 54
Discharge: HOME | End: 2024-01-18
Attending: EMERGENCY MEDICINE
Payer: COMMERCIAL

## 2024-01-18 VITALS
OXYGEN SATURATION: 98 % | TEMPERATURE: 98 F | HEIGHT: 72 IN | RESPIRATION RATE: 16 BRPM | BODY MASS INDEX: 42.66 KG/M2 | HEART RATE: 62 BPM | SYSTOLIC BLOOD PRESSURE: 117 MMHG | DIASTOLIC BLOOD PRESSURE: 73 MMHG | WEIGHT: 315 LBS

## 2024-01-18 DIAGNOSIS — N45.3 EPIDIDYMOORCHITIS: Primary | ICD-10-CM

## 2024-01-18 LAB
ANION GAP SERPL CALC-SCNC: <0 MMOL/L (ref 0–18)
BASOPHILS # BLD AUTO: 0.04 X10(3) UL (ref 0–0.2)
BASOPHILS NFR BLD AUTO: 0.5 %
BILIRUB UR QL: NEGATIVE
BUN BLD-MCNC: 12 MG/DL (ref 9–23)
BUN/CREAT SERPL: 10.3 (ref 10–20)
CALCIUM BLD-MCNC: 9.4 MG/DL (ref 8.7–10.4)
CHLORIDE SERPL-SCNC: 109 MMOL/L (ref 98–112)
CLARITY UR: CLEAR
CO2 SERPL-SCNC: 29 MMOL/L (ref 21–32)
CREAT BLD-MCNC: 1.16 MG/DL
DEPRECATED RDW RBC AUTO: 45.5 FL (ref 35.1–46.3)
EGFRCR SERPLBLD CKD-EPI 2021: 75 ML/MIN/1.73M2 (ref 60–?)
EOSINOPHIL # BLD AUTO: 0.21 X10(3) UL (ref 0–0.7)
EOSINOPHIL NFR BLD AUTO: 2.8 %
ERYTHROCYTE [DISTWIDTH] IN BLOOD BY AUTOMATED COUNT: 15.3 % (ref 11–15)
GLUCOSE BLD-MCNC: 92 MG/DL (ref 70–99)
GLUCOSE UR-MCNC: NORMAL MG/DL
HCT VFR BLD AUTO: 41.3 %
HGB BLD-MCNC: 13.8 G/DL
IMM GRANULOCYTES # BLD AUTO: 0.02 X10(3) UL (ref 0–1)
IMM GRANULOCYTES NFR BLD: 0.3 %
KETONES UR-MCNC: NEGATIVE MG/DL
LEUKOCYTE ESTERASE UR QL STRIP.AUTO: NEGATIVE
LYMPHOCYTES # BLD AUTO: 3.57 X10(3) UL (ref 1–4)
LYMPHOCYTES NFR BLD AUTO: 48.4 %
MCH RBC QN AUTO: 27.2 PG (ref 26–34)
MCHC RBC AUTO-ENTMCNC: 33.4 G/DL (ref 31–37)
MCV RBC AUTO: 81.5 FL
MONOCYTES # BLD AUTO: 0.63 X10(3) UL (ref 0.1–1)
MONOCYTES NFR BLD AUTO: 8.5 %
NEUTROPHILS # BLD AUTO: 2.9 X10 (3) UL (ref 1.5–7.7)
NEUTROPHILS # BLD AUTO: 2.9 X10(3) UL (ref 1.5–7.7)
NEUTROPHILS NFR BLD AUTO: 39.5 %
NITRITE UR QL STRIP.AUTO: NEGATIVE
OSMOLALITY SERPL CALC.SUM OF ELEC: 283 MOSM/KG (ref 275–295)
PH UR: 5 [PH] (ref 5–8)
PLATELET # BLD AUTO: 442 10(3)UL (ref 150–450)
POTASSIUM SERPL-SCNC: 4.3 MMOL/L (ref 3.5–5.1)
PROT UR-MCNC: NEGATIVE MG/DL
RBC # BLD AUTO: 5.07 X10(6)UL
SODIUM SERPL-SCNC: 137 MMOL/L (ref 136–145)
SP GR UR STRIP: 1.02 (ref 1–1.03)
UROBILINOGEN UR STRIP-ACNC: NORMAL
WBC # BLD AUTO: 7.4 X10(3) UL (ref 4–11)

## 2024-01-18 PROCEDURE — 99285 EMERGENCY DEPT VISIT HI MDM: CPT

## 2024-01-18 PROCEDURE — 36415 COLL VENOUS BLD VENIPUNCTURE: CPT

## 2024-01-18 PROCEDURE — 81001 URINALYSIS AUTO W/SCOPE: CPT | Performed by: EMERGENCY MEDICINE

## 2024-01-18 PROCEDURE — 87591 N.GONORRHOEAE DNA AMP PROB: CPT | Performed by: EMERGENCY MEDICINE

## 2024-01-18 PROCEDURE — 85025 COMPLETE CBC W/AUTO DIFF WBC: CPT | Performed by: EMERGENCY MEDICINE

## 2024-01-18 PROCEDURE — 76870 US EXAM SCROTUM: CPT | Performed by: EMERGENCY MEDICINE

## 2024-01-18 PROCEDURE — 51798 US URINE CAPACITY MEASURE: CPT

## 2024-01-18 PROCEDURE — 87491 CHLMYD TRACH DNA AMP PROBE: CPT | Performed by: EMERGENCY MEDICINE

## 2024-01-18 PROCEDURE — 80048 BASIC METABOLIC PNL TOTAL CA: CPT | Performed by: EMERGENCY MEDICINE

## 2024-01-18 PROCEDURE — 93975 VASCULAR STUDY: CPT | Performed by: EMERGENCY MEDICINE

## 2024-01-18 RX ORDER — LEVOFLOXACIN 500 MG/1
500 TABLET, FILM COATED ORAL DAILY
COMMUNITY

## 2024-01-18 RX ORDER — IBUPROFEN 600 MG/1
600 TABLET ORAL ONCE
Status: COMPLETED | OUTPATIENT
Start: 2024-01-18 | End: 2024-01-18

## 2024-01-18 RX ORDER — DOXYCYCLINE HYCLATE 100 MG/1
100 CAPSULE ORAL 2 TIMES DAILY
Qty: 20 CAPSULE | Refills: 0 | Status: SHIPPED | OUTPATIENT
Start: 2024-01-18 | End: 2024-01-28

## 2024-01-18 NOTE — ED INITIAL ASSESSMENT (HPI)
Pt presents with c/o left testicle pain and swelling.  Was recently treated for a urinary tract infection and treated with 2 courses of antibiotics.  No recent STI testing.

## 2024-01-18 NOTE — ED PROVIDER NOTES
Patient Seen in: [facility] Emergency Department      History     Chief Complaint   Patient presents with    Eval-G     Stated Complaint: Pain    Subjective:   HPI    53-year-old male presents for evaluation of testicular pain.  Patient reports Sunday into Monday he started noticing some discomfort in his right testicle, however this is acutely worsened this morning which prompted this visit to the emergency department.  Patient reports when he first noticed the pain he called his urologist, Dr. Lopez and was prescribed Levaquin.  He is taken it for the past few days but has not noticed any improvement in his symptoms.  No fever, chills, nausea, vomiting, dysuria, urinary frequency or urgency.    Objective:   Past Medical History:   Diagnosis Date    Chronic sinusitis 2013    Deviated nasal septum     Diverticulosis     Elevated blood pressure reading 2015    GERD (gastroesophageal reflux disease)     History of diverticulitis     Hypercholesterolemia 2013    Morbid obesity due to excess calories (HCC)     Obstructive sleep apnea syndrome 06/07/2018    Paroxysmal atrial fibrillation (HCC) 2013    Sleep apnea     uses CPAP    Torn meniscus     LEFT LEG    Urinary tract infection     Vitamin D deficiency 06/07/2018              Past Surgical History:   Procedure Laterality Date    COLONOSCOPY      EXCISION TURBINATE,SUBMUCOUS  11/25/2019    OTHER SURGICAL HISTORY Left 12/18/2019    left meniscal repair    REPAIR OF NASAL SEPTUM  11/25/2019                Social History     Socioeconomic History    Marital status:    Tobacco Use    Smoking status: Never    Smokeless tobacco: Never   Vaping Use    Vaping Use: Never used   Substance and Sexual Activity    Alcohol use: Not Currently     Comment: 1-2x/month    Drug use: Not Currently     Types: Cannabis     Comment: last use - 2018              Review of Systems    Positive for stated complaint: Pain  Other systems are as noted in HPI.  Constitutional and  vital signs reviewed.      All other systems reviewed and negative except as noted above.    Physical Exam     ED Triage Vitals   BP 01/18/24 0547 132/88   Pulse 01/18/24 0547 84   Resp 01/18/24 0547 18   Temp 01/18/24 0547 97.7 °F (36.5 °C)   Temp src 01/18/24 0547 Temporal   SpO2 01/18/24 0547 97 %   O2 Device 01/18/24 0630 None (Room air)       Current:/73   Pulse 62   Temp 97.7 °F (36.5 °C) (Temporal)   Resp 16   Ht 182.9 cm (6')   Wt (!) 147.4 kg   SpO2 98%   BMI 44.08 kg/m²         Physical Exam  Vitals and nursing note reviewed.   Constitutional:       General: He is not in acute distress.     Appearance: He is well-developed.   HENT:      Head: Normocephalic and atraumatic.   Eyes:      Conjunctiva/sclera: Conjunctivae normal.   Cardiovascular:      Rate and Rhythm: Normal rate and regular rhythm.      Heart sounds: Normal heart sounds.   Pulmonary:      Effort: Pulmonary effort is normal. No respiratory distress.      Breath sounds: Normal breath sounds.   Abdominal:      General: Bowel sounds are normal.      Palpations: Abdomen is soft.      Tenderness: There is no abdominal tenderness.   Musculoskeletal:         General: Normal range of motion.      Cervical back: Normal range of motion and neck supple.   Skin:     General: Skin is warm and dry.      Findings: No rash.   Neurological:      General: No focal deficit present.      Mental Status: He is alert and oriented to person, place, and time.               ED Course     Labs Reviewed   URINALYSIS WITH CULTURE REFLEX - Abnormal; Notable for the following components:       Result Value    Blood Urine Trace (*)     RBC Urine 3-5 (*)     Squamous Epi. Cells Few (*)     All other components within normal limits   BASIC METABOLIC PANEL (8) - Abnormal; Notable for the following components:    Anion Gap <0 (*)     All other components within normal limits   CBC W/ DIFFERENTIAL - Abnormal; Notable for the following components:    RDW 15.3 (*)      All other components within normal limits   CBC WITH DIFFERENTIAL WITH PLATELET    Narrative:     The following orders were created for panel order CBC With Differential With Platelet.  Procedure                               Abnormality         Status                     ---------                               -----------         ------                     CBC W/ DIFFERENTIAL[[id number]]          Abnormal            Final result                 Please view results for these tests on the individual orders.   RAINBOW DRAW BLUE   RAINBOW DRAW GOLD   CHLAMYDIA/GONOCOCCUS, GIO     US SCROTUM W/ DOPPLER (CPT=93975/43990)    Result Date: 1/18/2024  CONCLUSION:  1. Persistent/worsening left epididymitis with asymmetric enlargement/swelling and increased vascularity. 2. Moderate to large reactive left hydrocele has increased.  Small right hydrocele unchanged 3. Subtle asymmetric increased Doppler flow to the left testicle may reflect mild component of left testicular orchitis versus reactive increased Doppler vascularity.  No evidence of torsion.  4. Left varicocele visualized.  New line small left epididymal head cysts.  Small left epididymal appendage, incidental finding.    Dictated by (CST): Enrique Ramirez MD on 1/18/2024 at 8:32 AM     Finalized by (CST): Enrique Ramirez MD on 1/18/2024 at 8:41 AM           MDM                                       Medical Decision Making  Differential diagnosis includes but is not limited to epididymitis, orchitis, torsion    Discussed with patient's urologist, Dr. Lopez, agrees with plan for discharge, recommends adding doxycycline.  Discussed this with the patient as well as strict return precautions.  Patient verbalizes understanding of and agreement with this plan.    Problems Addressed:  Epididymoorchitis: acute illness or injury    Amount and/or Complexity of Data Reviewed  External Data Reviewed: labs.     Details: CBC and BMP stable compared to 5/18/2023  Labs:  ordered.  Radiology: ordered.  Discussion of management or test interpretation with external provider(s): Discussed with urology    Risk  Prescription drug management.        Disposition and Plan     Clinical Impression:  1. Epididymoorchitis         Disposition:  Discharge  1/18/2024 11:02 am    Follow-up:  Dejon Lopez MD  [address] [address]  [phone]    Schedule an appointment as soon as possible for a visit on 1/22/2024            Medications Prescribed:  Discharge Medication List as of 1/18/2024 11:04 AM        START taking these medications    Details   doxycycline 100 MG Oral Cap Take 1 capsule (100 mg total) by mouth 2 (two) times daily for 10 days., Normal, Disp-20 capsule, R-0

## 2024-01-18 NOTE — DISCHARGE INSTRUCTIONS
START taking the new antibiotic as well as the Levaquin    Take sitz bath's 3 times a day.  Use scrotal support with an athletic supporter and scrotal elevation with a towel roll between your legs elevating the scrotum when not walking.    Take Advil 600 3 times a day for the next 3 to 5 days.  He can also use Tylenol as needed for pain.    See Dr. Lopez on Monday for follow-up.  Call his office to make an appointment time.    Return to the ER if you develop worsening symptoms including worsening pain, fever, vomiting, or any emergent concerns.

## 2024-01-19 LAB
C TRACH DNA SPEC QL NAA+PROBE: NEGATIVE
N GONORRHOEA DNA SPEC QL NAA+PROBE: NEGATIVE

## 2024-01-19 RX ORDER — SEMAGLUTIDE 0.25 MG/.5ML
0.25 INJECTION, SOLUTION SUBCUTANEOUS WEEKLY
COMMUNITY
Start: 2023-12-26

## 2024-01-26 NOTE — DISCHARGE INSTRUCTIONS
HOME INSTRUCTIONS  ---TAKE TYLENOL FOR PAIN  --FOLLOW UP APPOINTMENT IN 2 WEEKS  --FINISH ANTIBIOTICS FROM HOME  Cystoscopy  Cystoscopy is a procedure that lets your healthcare provider look directly inside your urethra and bladder. It can be used to:  Help diagnose a problem with your urethra, bladder, or kidneys.  Take a sample (biopsy) of bladder or urethral tissue.  Treat certain problems (such as removing kidney stones).  Place a tube (stent) to bypass a blockage.  Take special X-rays of the kidneys.  Based on the findings, your provider may advise other tests or treatments.  What is a cystoscope?  A cystoscope is a telescope-like tube that contains lenses and small glass wires that make bright light (fiberoptics). The cystoscope may be straight and rigid. Or it may be flexible to bend around curves in the urethra. The healthcare provider may look directly into the cystoscope, or project the image onto a screen.    [facility]BS[facility]G HOME CARE INSTRUCTIONS: POST-OP ANESTHESIA  The medication that you received for sedation or general anesthesia can last up to 24 hours. Your judgment and reflexes may be altered, even if you feel like your normal self.      We Recommend:   Do not drive any motor vehicle or bicycle   Avoid mowing the lawn, playing sports, or working with power tools/applicances (power saws, electric knives or mixers)   That you have someone stay with you on your first night home   Do not drink alcohol or take sleeping pills or tranquilizers   Do not sign legal documents within 24 hours of your procedure   If you had a nerve block for your surgery, take extra care not to put any pressure on your arm or hand for 24 hours    It is normal:  For you to have a sore throat if you had a breathing tube during surgery (while you were asleep!). The sore throat should get better within 48 hours. You can gargle with warm salt water (1/2 tsp in 4 oz warm water) or use a throat lozenge for comfort  To feel muscle aches or  soreness especially in the abdomen, chest or neck. The achy feeling should go away in the next 24 hours  To feel weak, sleepy or \"wiped out\". Your should start feeling better in the next 24 hours.   To experience mild discomforts such as sore lip or tongue, headache, cramps, gas pains or a bloated feeling in your abdomen.   To experience mild back pain or soreness for a day or two if you had spinal or epidural anesthesia.   If you had laparoscopic surgery, to feel shoulder pain or discomfort on the day of surgery.   For some patients to have nausea after surgery/anesthesia    If you feel nausea or experience vomiting:   Try to move around less.   Eat less than usual or drink only liquids until the next morning   Nausea should resolve in about 24 hours    If you have a problem when you are at home:    Call your surgeons office   Discharge Instructions: After Your Surgery  You’ve just had surgery. During surgery, you were given medicine called anesthesia to keep you relaxed and free of pain. After surgery, you may have some pain or nausea. This is common. Here are some tips for feeling better and getting well after surgery.   Going home  Your healthcare provider will show you how to take care of yourself when you go home. They'll also answer your questions. Have an adult family member or friend drive you home. For the first 24 hours after your surgery:   Don't drive or use heavy equipment.  Don't make important decisions or sign legal papers.  Take medicines as directed.  Don't drink alcohol.  Have someone stay with you, if needed. They can watch for problems and help keep you safe.  Be sure to go to all follow-up visits with your healthcare provider. And rest after your surgery for as long as your provider tells you to.   Coping with pain  If you have pain after surgery, pain medicine will help you feel better. Take it as directed, before pain becomes severe. Also, ask your healthcare provider or pharmacist about  other ways to control pain. This might be with heat, ice, or relaxation. And follow any other instructions your surgeon or nurse gives you.      Stay on schedule with your medicine.     Tips for taking pain medicine  To get the best relief possible, remember these points:   Pain medicines can upset your stomach. Taking them with a little food may help.  Most pain relievers taken by mouth need at least 20 to 30 minutes to start to work.  Don't wait till your pain becomes severe before you take your medicine. Try to time your medicine so that you can take it before starting an activity. This might be before you get dressed, go for a walk, or sit down for dinner.  Constipation is a common side effect of some pain medicines. Call your healthcare provider before taking any medicines such as laxatives or stool softeners to help ease constipation. Also ask if you should skip any foods. Drinking lots of fluids and eating foods such as fruits and vegetables that are high in fiber can also help. Remember, don't take laxatives unless your surgeon has prescribed them.  Drinking alcohol and taking pain medicine can cause dizziness and slow your breathing. It can even be deadly. Don't drink alcohol while taking pain medicine.  Pain medicine can make you react more slowly to things. Don't drive or run machinery while taking pain medicine.  Your healthcare provider may tell you to take acetaminophen to help ease your pain. Ask them how much you're supposed to take each day. Acetaminophen or other pain relievers may interact with your prescription medicines or other over-the-counter (OTC) medicines. Some prescription medicines have acetaminophen and other ingredients in them. Using both prescription and OTC acetaminophen for pain can cause you to accidentally overdose. Read the labels on your OTC medicines with care. This will help you to clearly know the list of ingredients, how much to take, and any warnings. It may also help you  not take too much acetaminophen. If you have questions or don't understand the information, ask your pharmacist or healthcare provider to explain it to you before you take the OTC medicine.   Managing nausea  Some people have an upset stomach (nausea) after surgery. This is often because of anesthesia, pain, or pain medicine, less movement of food in the stomach, or the stress of surgery. These tips will help you handle nausea and eat healthy foods as you get better. If you were on a special food plan before surgery, ask your healthcare provider if you should follow it while you get better. Check with your provider on how your eating should progress. It may depend on the surgery you had. These general tips may help:   Don't push yourself to eat. Your body will tell you when to eat and how much.  Start off with clear liquids and soup. They're easier to digest.  Next try semi-solid foods as you feel ready. These include mashed potatoes, applesauce, and gelatin.  Slowly move to solid foods. Don’t eat fatty, rich, or spicy foods at first.  Don't force yourself to have 3 large meals a day. Instead eat smaller amounts more often.  Take pain medicines with a small amount of solid food, such as crackers or toast. This helps prevent nausea.  When to call your healthcare provider  Call your healthcare provider right away if you have any of these:   You still have too much pain, or the pain gets worse, after taking the medicine. The medicine may not be strong enough. Or there may be a complication from the surgery.  You feel too sleepy, dizzy, or groggy. The medicine may be too strong.  Side effects such as nausea or vomiting. Your healthcare provider may advise taking other medicines to .  Skin changes such as rash, itching, or hives. This may mean you have an allergic reaction. Your provider may advise taking other medicines.  The incision looks different (for instance, part of it opens up).  Bleeding or fluid leaking from  the incision site, and weren't told to expect that.  Fever of 100.4°F (38°C) or higher, or as directed by your provider.  Call 911  Call 911 right away if you have:   Trouble breathing  Facial swelling    If you have obstructive sleep apnea   You were given anesthesia medicine during surgery to keep you comfortable and free of pain. After surgery, you may have more apnea spells because of this medicine and other medicines you were given. The spells may last longer than normal.    At home:  Keep using the continuous positive airway pressure (CPAP) device when you sleep. Unless your healthcare provider tells you not to, use it when you sleep, day or night. CPAP is a common device used to treat obstructive sleep apnea.  Talk with your provider before taking any pain medicine, muscle relaxants, or sedatives. Your provider will tell you about the possible dangers of taking these medicines.  Contact your provider if your sleeping changes a lot even when taking medicines as directed.  [employer] last reviewed this educational content on 10/1/2021  © 3406-3384 [employer]. All rights reserved. This information is not intended as a substitute for professional medical care. Always follow your healthcare professional's instructions.

## 2024-01-29 ENCOUNTER — HOSPITAL ENCOUNTER (OUTPATIENT)
Facility: HOSPITAL | Age: 54
Setting detail: HOSPITAL OUTPATIENT SURGERY
Discharge: HOME | End: 2024-01-29
Attending: UROLOGY | Admitting: UROLOGY
Payer: COMMERCIAL

## 2024-01-29 ENCOUNTER — ANESTHESIA (OUTPATIENT)
Dept: SURGERY | Facility: HOSPITAL | Age: 54
End: 2024-01-29
Payer: COMMERCIAL

## 2024-01-29 ENCOUNTER — ANESTHESIA EVENT (OUTPATIENT)
Dept: SURGERY | Facility: HOSPITAL | Age: 54
End: 2024-01-29
Payer: COMMERCIAL

## 2024-01-29 ENCOUNTER — APPOINTMENT (OUTPATIENT)
Dept: GENERAL RADIOLOGY | Facility: HOSPITAL | Age: 54
End: 2024-01-29
Attending: UROLOGY
Payer: COMMERCIAL

## 2024-01-29 VITALS
WEIGHT: 315 LBS | HEART RATE: 79 BPM | BODY MASS INDEX: 42.66 KG/M2 | SYSTOLIC BLOOD PRESSURE: 115 MMHG | DIASTOLIC BLOOD PRESSURE: 72 MMHG | RESPIRATION RATE: 16 BRPM | HEIGHT: 72 IN | OXYGEN SATURATION: 98 % | TEMPERATURE: 98 F

## 2024-01-29 PROCEDURE — 88305 TISSUE EXAM BY PATHOLOGIST: CPT | Performed by: UROLOGY

## 2024-01-29 PROCEDURE — BT14ZZZ FLUOROSCOPY OF KIDNEYS, URETERS AND BLADDER: ICD-10-PCS | Performed by: UROLOGY

## 2024-01-29 PROCEDURE — 88342 IMHCHEM/IMCYTCHM 1ST ANTB: CPT | Performed by: UROLOGY

## 2024-01-29 PROCEDURE — 0TBB8ZX EXCISION OF BLADDER, VIA NATURAL OR ARTIFICIAL OPENING ENDOSCOPIC, DIAGNOSTIC: ICD-10-PCS | Performed by: UROLOGY

## 2024-01-29 RX ORDER — PROCHLORPERAZINE EDISYLATE 5 MG/ML
5 INJECTION INTRAMUSCULAR; INTRAVENOUS EVERY 8 HOURS PRN
Status: DISCONTINUED | OUTPATIENT
Start: 2024-01-29 | End: 2024-01-29

## 2024-01-29 RX ORDER — METOCLOPRAMIDE 10 MG/1
10 TABLET ORAL ONCE
Status: COMPLETED | OUTPATIENT
Start: 2024-01-29 | End: 2024-01-29

## 2024-01-29 RX ORDER — ACETAMINOPHEN 500 MG
1000 TABLET ORAL EVERY 6 HOURS PRN
COMMUNITY

## 2024-01-29 RX ORDER — FAMOTIDINE 20 MG/1
20 TABLET, FILM COATED ORAL ONCE
Status: COMPLETED | OUTPATIENT
Start: 2024-01-29 | End: 2024-01-29

## 2024-01-29 RX ORDER — SODIUM CHLORIDE, SODIUM LACTATE, POTASSIUM CHLORIDE, CALCIUM CHLORIDE 600; 310; 30; 20 MG/100ML; MG/100ML; MG/100ML; MG/100ML
INJECTION, SOLUTION INTRAVENOUS CONTINUOUS
Status: DISCONTINUED | OUTPATIENT
Start: 2024-01-29 | End: 2024-01-29

## 2024-01-29 RX ORDER — DEXAMETHASONE SODIUM PHOSPHATE 4 MG/ML
VIAL (ML) INJECTION AS NEEDED
Status: DISCONTINUED | OUTPATIENT
Start: 2024-01-29 | End: 2024-01-29 | Stop reason: SURG

## 2024-01-29 RX ORDER — HYDROMORPHONE HYDROCHLORIDE 1 MG/ML
0.4 INJECTION, SOLUTION INTRAMUSCULAR; INTRAVENOUS; SUBCUTANEOUS EVERY 2 HOUR PRN
Status: CANCELLED | OUTPATIENT
Start: 2024-01-29

## 2024-01-29 RX ORDER — MORPHINE SULFATE 10 MG/ML
6 INJECTION, SOLUTION INTRAMUSCULAR; INTRAVENOUS EVERY 10 MIN PRN
OUTPATIENT
Start: 2024-01-29

## 2024-01-29 RX ORDER — ONDANSETRON 2 MG/ML
4 INJECTION INTRAMUSCULAR; INTRAVENOUS EVERY 6 HOURS PRN
Status: DISCONTINUED | OUTPATIENT
Start: 2024-01-29 | End: 2024-01-29

## 2024-01-29 RX ORDER — ACETAMINOPHEN 500 MG
1000 TABLET ORAL ONCE
Status: COMPLETED | OUTPATIENT
Start: 2024-01-29 | End: 2024-01-29

## 2024-01-29 RX ORDER — OXYCODONE HYDROCHLORIDE 5 MG/1
5 TABLET ORAL EVERY 4 HOURS PRN
Status: CANCELLED | OUTPATIENT
Start: 2024-01-29

## 2024-01-29 RX ORDER — HYDROMORPHONE HYDROCHLORIDE 1 MG/ML
0.8 INJECTION, SOLUTION INTRAMUSCULAR; INTRAVENOUS; SUBCUTANEOUS EVERY 2 HOUR PRN
Status: CANCELLED | OUTPATIENT
Start: 2024-01-29

## 2024-01-29 RX ORDER — GLYCOPYRROLATE 0.2 MG/ML
INJECTION, SOLUTION INTRAMUSCULAR; INTRAVENOUS AS NEEDED
Status: DISCONTINUED | OUTPATIENT
Start: 2024-01-29 | End: 2024-01-29 | Stop reason: SURG

## 2024-01-29 RX ORDER — ONDANSETRON 2 MG/ML
4 INJECTION INTRAMUSCULAR; INTRAVENOUS EVERY 6 HOURS PRN
OUTPATIENT
Start: 2024-01-29

## 2024-01-29 RX ORDER — SODIUM CHLORIDE, SODIUM LACTATE, POTASSIUM CHLORIDE, CALCIUM CHLORIDE 600; 310; 30; 20 MG/100ML; MG/100ML; MG/100ML; MG/100ML
INJECTION, SOLUTION INTRAVENOUS CONTINUOUS
OUTPATIENT
Start: 2024-01-29

## 2024-01-29 RX ORDER — HYDROMORPHONE HYDROCHLORIDE 1 MG/ML
0.6 INJECTION, SOLUTION INTRAMUSCULAR; INTRAVENOUS; SUBCUTANEOUS EVERY 5 MIN PRN
OUTPATIENT
Start: 2024-01-29

## 2024-01-29 RX ORDER — PHENAZOPYRIDINE HYDROCHLORIDE 200 MG/1
200 TABLET, FILM COATED ORAL ONCE AS NEEDED
Status: COMPLETED | OUTPATIENT
Start: 2024-01-29 | End: 2024-01-29

## 2024-01-29 RX ORDER — MORPHINE SULFATE 4 MG/ML
2 INJECTION, SOLUTION INTRAMUSCULAR; INTRAVENOUS EVERY 10 MIN PRN
OUTPATIENT
Start: 2024-01-29

## 2024-01-29 RX ORDER — MORPHINE SULFATE 10 MG/ML
6 INJECTION, SOLUTION INTRAMUSCULAR; INTRAVENOUS EVERY 10 MIN PRN
Status: DISCONTINUED | OUTPATIENT
Start: 2024-01-29 | End: 2024-01-29

## 2024-01-29 RX ORDER — MORPHINE SULFATE 4 MG/ML
2 INJECTION, SOLUTION INTRAMUSCULAR; INTRAVENOUS EVERY 10 MIN PRN
Status: DISCONTINUED | OUTPATIENT
Start: 2024-01-29 | End: 2024-01-29

## 2024-01-29 RX ORDER — ACETAMINOPHEN 325 MG/1
650 TABLET ORAL
Status: CANCELLED | OUTPATIENT
Start: 2024-01-29

## 2024-01-29 RX ORDER — ROCURONIUM BROMIDE 10 MG/ML
INJECTION, SOLUTION INTRAVENOUS AS NEEDED
Status: DISCONTINUED | OUTPATIENT
Start: 2024-01-29 | End: 2024-01-29 | Stop reason: SURG

## 2024-01-29 RX ORDER — NALOXONE HYDROCHLORIDE 0.4 MG/ML
80 INJECTION, SOLUTION INTRAMUSCULAR; INTRAVENOUS; SUBCUTANEOUS AS NEEDED
Status: DISCONTINUED | OUTPATIENT
Start: 2024-01-29 | End: 2024-01-29

## 2024-01-29 RX ORDER — MORPHINE SULFATE 4 MG/ML
4 INJECTION, SOLUTION INTRAMUSCULAR; INTRAVENOUS EVERY 10 MIN PRN
OUTPATIENT
Start: 2024-01-29

## 2024-01-29 RX ORDER — PHENAZOPYRIDINE HYDROCHLORIDE 95 MG/1
95 TABLET ORAL 3 TIMES DAILY PRN
Qty: 21 TABLET | Refills: 5 | Status: SHIPPED | OUTPATIENT
Start: 2024-01-29

## 2024-01-29 RX ORDER — HYDROMORPHONE HYDROCHLORIDE 1 MG/ML
0.4 INJECTION, SOLUTION INTRAMUSCULAR; INTRAVENOUS; SUBCUTANEOUS EVERY 5 MIN PRN
Status: DISCONTINUED | OUTPATIENT
Start: 2024-01-29 | End: 2024-01-29

## 2024-01-29 RX ORDER — HYDROMORPHONE HYDROCHLORIDE 1 MG/ML
0.2 INJECTION, SOLUTION INTRAMUSCULAR; INTRAVENOUS; SUBCUTANEOUS EVERY 5 MIN PRN
Status: DISCONTINUED | OUTPATIENT
Start: 2024-01-29 | End: 2024-01-29

## 2024-01-29 RX ORDER — HYDROMORPHONE HYDROCHLORIDE 1 MG/ML
0.4 INJECTION, SOLUTION INTRAMUSCULAR; INTRAVENOUS; SUBCUTANEOUS EVERY 5 MIN PRN
OUTPATIENT
Start: 2024-01-29

## 2024-01-29 RX ORDER — HYDROMORPHONE HYDROCHLORIDE 1 MG/ML
0.2 INJECTION, SOLUTION INTRAMUSCULAR; INTRAVENOUS; SUBCUTANEOUS EVERY 5 MIN PRN
OUTPATIENT
Start: 2024-01-29

## 2024-01-29 RX ORDER — OXYCODONE HYDROCHLORIDE 5 MG/1
10 TABLET ORAL EVERY 4 HOURS PRN
Status: CANCELLED | OUTPATIENT
Start: 2024-01-29

## 2024-01-29 RX ORDER — NALOXONE HYDROCHLORIDE 0.4 MG/ML
80 INJECTION, SOLUTION INTRAMUSCULAR; INTRAVENOUS; SUBCUTANEOUS AS NEEDED
OUTPATIENT
Start: 2024-01-29 | End: 2024-01-29

## 2024-01-29 RX ORDER — ONDANSETRON 2 MG/ML
INJECTION INTRAMUSCULAR; INTRAVENOUS AS NEEDED
Status: DISCONTINUED | OUTPATIENT
Start: 2024-01-29 | End: 2024-01-29 | Stop reason: SURG

## 2024-01-29 RX ORDER — MORPHINE SULFATE 4 MG/ML
4 INJECTION, SOLUTION INTRAMUSCULAR; INTRAVENOUS EVERY 10 MIN PRN
Status: DISCONTINUED | OUTPATIENT
Start: 2024-01-29 | End: 2024-01-29

## 2024-01-29 RX ORDER — PROCHLORPERAZINE EDISYLATE 5 MG/ML
5 INJECTION INTRAMUSCULAR; INTRAVENOUS EVERY 8 HOURS PRN
OUTPATIENT
Start: 2024-01-29

## 2024-01-29 RX ORDER — LIDOCAINE HYDROCHLORIDE 10 MG/ML
INJECTION, SOLUTION EPIDURAL; INFILTRATION; INTRACAUDAL; PERINEURAL AS NEEDED
Status: DISCONTINUED | OUTPATIENT
Start: 2024-01-29 | End: 2024-01-29 | Stop reason: SURG

## 2024-01-29 RX ORDER — FAMOTIDINE 10 MG/ML
20 INJECTION, SOLUTION INTRAVENOUS ONCE
Status: COMPLETED | OUTPATIENT
Start: 2024-01-29 | End: 2024-01-29

## 2024-01-29 RX ORDER — METOCLOPRAMIDE HYDROCHLORIDE 5 MG/ML
10 INJECTION INTRAMUSCULAR; INTRAVENOUS ONCE
Status: COMPLETED | OUTPATIENT
Start: 2024-01-29 | End: 2024-01-29

## 2024-01-29 RX ORDER — MIDAZOLAM HYDROCHLORIDE 1 MG/ML
INJECTION INTRAMUSCULAR; INTRAVENOUS AS NEEDED
Status: DISCONTINUED | OUTPATIENT
Start: 2024-01-29 | End: 2024-01-29 | Stop reason: SURG

## 2024-01-29 RX ORDER — HYDROMORPHONE HYDROCHLORIDE 1 MG/ML
0.6 INJECTION, SOLUTION INTRAMUSCULAR; INTRAVENOUS; SUBCUTANEOUS EVERY 5 MIN PRN
Status: DISCONTINUED | OUTPATIENT
Start: 2024-01-29 | End: 2024-01-29

## 2024-01-29 RX ORDER — GENTAMICIN SULFATE 40 MG/ML
INJECTION, SOLUTION INTRAMUSCULAR; INTRAVENOUS AS NEEDED
Status: DISCONTINUED | OUTPATIENT
Start: 2024-01-29 | End: 2024-01-29 | Stop reason: HOSPADM

## 2024-01-29 RX ADMIN — DEXAMETHASONE SODIUM PHOSPHATE 4 MG: 4 MG/ML VIAL (ML) INJECTION at 14:01:00

## 2024-01-29 RX ADMIN — LIDOCAINE HYDROCHLORIDE 50 MG: 10 INJECTION, SOLUTION EPIDURAL; INFILTRATION; INTRACAUDAL; PERINEURAL at 13:57:00

## 2024-01-29 RX ADMIN — ONDANSETRON 4 MG: 2 INJECTION INTRAMUSCULAR; INTRAVENOUS at 14:01:00

## 2024-01-29 RX ADMIN — MIDAZOLAM HYDROCHLORIDE 2 MG: 1 INJECTION INTRAMUSCULAR; INTRAVENOUS at 13:49:00

## 2024-01-29 RX ADMIN — ROCURONIUM BROMIDE 20 MG: 10 INJECTION, SOLUTION INTRAVENOUS at 14:05:00

## 2024-01-29 RX ADMIN — SODIUM CHLORIDE, SODIUM LACTATE, POTASSIUM CHLORIDE, CALCIUM CHLORIDE: 600; 310; 30; 20 INJECTION, SOLUTION INTRAVENOUS at 13:47:00

## 2024-01-29 RX ADMIN — ROCURONIUM BROMIDE 10 MG: 10 INJECTION, SOLUTION INTRAVENOUS at 13:57:00

## 2024-01-29 RX ADMIN — GLYCOPYRROLATE 0.2 MG: 0.2 INJECTION, SOLUTION INTRAMUSCULAR; INTRAVENOUS at 13:57:00

## 2024-01-29 NOTE — ANESTHESIA PREPROCEDURE EVALUATION
Anesthesia PreOp Note    HPI:     Alexis Greer is a 53 year old male who presents for preoperative consultation requested by: Dejon Lopez MD    Date of Surgery: 1/29/2024    Procedure(s):  Cystoscopy, possible biopsy, bilateral retrograde pyelogram  Indication: Hematuria    Relevant Problems   No relevant active problems       NPO:  Last Liquid Consumption Date: 01/28/24  Last Liquid Consumption Time: 2230  Last Solid Consumption Date: 01/28/24  Last Solid Consumption Time: 2200  Last Liquid Consumption Date: 01/28/24          History Review:  Patient Active Problem List    Diagnosis Date Noted    Lung nodule < 6cm on CT 02/21/2022    Routine physical examination 04/23/2021    Lytic lesion of bone on x-ray 03/05/2021    LUQ pain 01/22/2021    Gastroesophageal reflux disease with esophagitis without hemorrhage 01/22/2021    Palpitations 05/07/2020    History of 2019 novel coronavirus disease (COVID-19) 04/01/2020    Pulmonary HTN (HCC) 03/20/2020    Rectal bleeding 05/03/2019    Mild intermittent asthma without complication 05/03/2019    Obesity, morbid (HCC) 02/06/2019    Impingement syndrome of shoulder region 02/06/2019    Knee pain 02/06/2019    Obstructive sleep apnea syndrome 06/07/2018    Vitamin D deficiency 06/07/2018    Diverticulosis     Gastroesophageal reflux disease, esophagitis presence not specified     Elevated blood pressure reading     Allergic rhinitis 03/03/2014    Nasal congestion 01/31/2014    Diverticulitis 01/01/2014    Cough 10/25/2013    Paroxysmal atrial fibrillation (HCC) 01/01/2013    Acute sinusitis 01/01/2013       Past Medical History:   Diagnosis Date    Chronic sinusitis 2013    Deviated nasal septum     Diverticulosis     Elevated blood pressure reading 2015    GERD (gastroesophageal reflux disease)     History of diverticulitis     Hypercholesterolemia 2013    Morbid obesity due to excess calories (HCC)     Obstructive sleep apnea syndrome 06/07/2018    Paroxysmal  atrial fibrillation (HCC) 2013    Sleep apnea     uses CPAP    Torn meniscus     LEFT LEG    Urinary tract infection     Visual impairment     glasses for reading    Vitamin D deficiency 2018       Past Surgical History:   Procedure Laterality Date    COLONOSCOPY      EXCISION TURBINATE,SUBMUCOUS  2019    OTHER SURGICAL HISTORY Left 2019    left meniscal repair    REPAIR OF NASAL SEPTUM  2019       Medications Prior to Admission   Medication Sig Dispense Refill Last Dose    acetaminophen 500 MG Oral Tab Take 2 tablets (1,000 mg total) by mouth every 6 (six) hours as needed for Pain.   2024    levoFLOXacin 500 MG Oral Tab Take 1 tablet (500 mg total) by mouth daily.   2024    [] doxycycline 100 MG Oral Cap Take 1 capsule (100 mg total) by mouth 2 (two) times daily for 10 days. 20 capsule 0 2024    azelastine 0.1 % Nasal Solution 2 sprays by Nasal route 2 (two) times daily. 30 mL 0     ESOMEPRAZOLE MAGNESIUM 20 MG Oral Capsule Delayed Release TAKE 1 CAPSULE EVERY MORNING BEFORE A MEAL AS NEEDED (BREAKFAST) 90 capsule 1 2024    clotrimazole 1 % External Cream Apply to affected area(s) BID until resolved. 24 g 0     Ascorbic Acid (VITAMIN C) 500 MG Oral Cap Take 1 each by mouth 2 (two) times a day. 60 capsule 5 2024 at 1600    Vitamin B-12 1000 MCG Oral Tab Take 1 tablet (1,000 mcg total) by mouth daily.   2024    Cholecalciferol (VITAMIN D3) 50 MCG (2000 UT) Oral Tab Take 1 tablet (2,000 Units total) by mouth daily.   2024 at 1600    semaglutide-weight management (WEGOVY) 0.25 MG/0.5ML Subcutaneous Solution Auto-injector Inject 0.5 mL (0.25 mg total) into the skin once a week.       Azelastine HCl 137 MCG/SPRAY Nasal Solution USE 2 SPRAYS IN EACH NOSTRIL TWICE A DAY AS DIRECTED 90 mL 1 2024    FLUTICASONE PROPIONATE 50 MCG/ACT Nasal Suspension USE 1 SPRAY NASALLY TWICE A DAY (Patient taking differently: 1 spray by Nasal route every morning.) 48 g 3  1/27/2024    Meloxicam 15 MG Oral Tab Take 1 tablet (15 mg total) by mouth daily as needed for Pain.   1/18/2024     Current Facility-Administered Medications Ordered in Epic   Medication Dose Route Frequency Provider Last Rate Last Admin    lactated ringers infusion   Intravenous Continuous Dejon Lopez MD 20 mL/hr at 01/29/24 1233 New Bag at 01/29/24 1233     No current [facility]-ordered outpatient medications on file.       No Known Allergies    Family History   Problem Relation Age of Onset    Diabetes Brother     High Blood Pressure Brother     Heart Disorder Brother     Hypertension Brother     Hypertension Father     Other (Diverticulitis) Father      Social History     Socioeconomic History    Marital status:    Tobacco Use    Smoking status: Never    Smokeless tobacco: Never   Vaping Use    Vaping Use: Never used   Substance and Sexual Activity    Alcohol use: Not Currently     Comment: 1-2x/month    Drug use: Not Currently     Types: Cannabis     Comment: last use 1/1/24       Available pre-op labs reviewed.  Lab Results   Component Value Date    WBC 7.4 01/18/2024    RBC 5.07 01/18/2024    HGB 13.8 01/18/2024    HCT 41.3 01/18/2024    MCV 81.5 01/18/2024    MCH 27.2 01/18/2024    MCHC 33.4 01/18/2024    RDW 15.3 (H) 01/18/2024    .0 01/18/2024     Lab Results   Component Value Date     01/18/2024    K 4.3 01/18/2024     01/18/2024    CO2 29.0 01/18/2024    BUN 12 01/18/2024    CREATSERUM 1.16 01/18/2024    GLU 92 01/18/2024    CA 9.4 01/18/2024          Vital Signs:  Body mass index is 44.48 kg/m².   height is 1.829 m (6') and weight is 148.8 kg (328 lb) (abnormal). His oral temperature is 98.9 °F (37.2 °C). His blood pressure is 138/87 and his pulse is 65. His respiration is 16 and oxygen saturation is 95%.   Vitals:    01/25/24 1134 01/29/24 1213   BP:  138/87   Pulse:  65   Resp:  16   Temp:  98.9 °F (37.2 °C)   TempSrc:  Oral   SpO2:  95%   Weight: (!) 147.9 kg (326 lb) (!)  148.8 kg (328 lb)   Height: 1.829 m (6')         Anesthesia Evaluation      Airway   Mallampati: II  TM distance: >3 FB  Neck ROM: full  Dental - Dentition appears grossly intact     Pulmonary - normal exam   (+) asthma, sleep apnea on CPAP  Cardiovascular - normal exam  (+) dysrhythmias (Parox afib)  (-) hypertension, past MI    ECG reviewed  ROS comment: Echo:  Left ventricle: The cavity size was normal. Wall thickness was      increased in a pattern of mild LVH. Systolic function was      normal. The estimated ejection fraction was 50-55%, by 3D      calcuation. Wall motion was normal; there were no regional wall      motion abnormalities. Left ventricular diastolic function      parameters were normal.   2. Mitral valve: Mildly calcified annulus. Normal thickened      leaflets.   3. Right ventricle: The cavity size was dilated.   4. Impressions: The right ventricular systolic pressure was      increased consistent with mild pulmonary hypertension.       Neuro/Psych    (-) CVA    GI/Hepatic/Renal    (+) GERD    Endo/Other    (+) diabetes mellitus  Abdominal   (+) obese                 Anesthesia Plan:   ASA:  3  Plan:   General  Airway:  ETT  Post-op Pain Management: IV analgesics and Oral pain medication  Informed Consent Plan and Risks Discussed With:  Patient  Discussed plan with:  CRNA      I have informed Alexis De Patel Cody and/or legal guardian or family member of the nature of the anesthetic plan, benefits, risks including possible dental damage if relevant, major complications, and any alternative forms of anesthetic management.   All of the patient's questions were answered to the best of my ability. The patient desires the anesthetic management as planned.  RUSTY GOMEZ MD  1/29/2024 1:24 PM  Present on Admission:  **None**

## 2024-01-29 NOTE — ANESTHESIA POSTPROCEDURE EVALUATION
Patient: Alexis Greer    Procedure Summary       Date: 01/29/24 Room / Location: [facility] MAIN OR  / [facility] MAIN OR    Anesthesia Start: 1347 Anesthesia Stop:     Procedure: Cystoscopy,,bilateral retrograde pyelogram. removal of bladder lesion, fulguration. Diagnosis: (Hematuria)    Surgeons: Dejon Lopez MD Anesthesiologist: Vickie Monsalve MD    Anesthesia Type: general ASA Status: 3            Anesthesia Type: general    Vitals Value Taken Time   /90 01/29/24 1449   Temp 98.1 °F (36.7 °C) 01/29/24 1449   Pulse 95 01/29/24 1450   Resp 19 01/29/24 1450   SpO2 96 % 01/29/24 1450   Vitals shown include unfiled device data.    [facility] AN Post Evaluation:   Patient Evaluated in PACU  Patient Participation: complete - patient participated  Level of Consciousness: awake  Pain Score: 0  Pain Management: adequate  Airway Patency:patent  Dental exam unchanged from preop  Yes    Cardiovascular Status: stable  Respiratory Status: nasal cannula  Postoperative Hydration stable      Korin Ellis CRNA  1/29/2024 2:50 PM

## 2024-01-29 NOTE — INTERVAL H&P NOTE
Pre-op Diagnosis: Hematuria    The above referenced H&P was reviewed by LILA HERMOSILLO MD on 1/29/2024, the patient was examined and no significant changes have occurred in the patient's condition since the H&P was performed.  I discussed with the patient and/or legal representative the potential benefits, risks and side effects of this procedure; the likelihood of the patient achieving goals; and potential problems that might occur during recuperation.  I discussed reasonable alternatives to the procedure, including risks, benefits and side effects related to the alternatives and risks related to not receiving this procedure.  We will proceed with procedure as planned.

## 2024-01-29 NOTE — ANESTHESIA PROCEDURE NOTES
Airway  Date/Time: 1/29/2024 1:59 PM  Urgency: Elective    Airway not difficult    General Information and Staff    Patient location during procedure: OR  Anesthesiologist: Vickie Monsalve MD  Resident/CRNA: Korin Ellis CRNA  Performed: CRNA   Performed by: Korin Ellis CRNA  Authorized by: Vickie Monsalve MD      Indications and Patient Condition  Indications for airway management: anesthesia  Sedation level: deep  Preoxygenated: yes  Patient position: sniffing  Mask difficulty assessment: 1 - vent by mask    Final Airway Details  Final airway type: endotracheal airway      Successful airway: ETT  Cuffed: yes   Successful intubation technique: direct laryngoscopy  Facilitating devices/methods: intubating stylet and anterior pressure/BURP  Endotracheal tube insertion site: oral  Blade: Tk  Blade size: #4  ETT size (mm): 7.5    Cormack-Lehane Classification: grade IIA - partial view of glottis  Placement verified by: capnometry   Cuff volume (mL): 8  Measured from: teeth  ETT to teeth (cm): 22  Number of attempts at approach: 1    Additional Comments  Atraumatic. 9.0 opa

## 2024-01-29 NOTE — BRIEF OP NOTE
[facility]    POST ANESTHESIA CARE UNIT  Brief Op Note       Patients Name: Alexis Greer  Attending Physician: Dejon Hermosillo MD  Operating Physician: DEJON HERMOSILLO MD  CSN: [id number]     Location:  OR  MRN: [id number]     YOB: 1970  Admission Date: 1/29/2024  Operation Date: 1/29/2024    Pre-Operative Diagnosis: Hematuria, urinary of left hydrocele, history of left epididymal orchitis    Post-Operative Diagnosis: Same with small bladder lesion, bilateral Hutch diverticuli, hypospadias.    Procedure Performed: Cystoscopy,,bilateral retrograde pyelogram. removal of bladder lesion, fulguration.  fluroscopy And biopsy and rectal exam    Primary Surgeon:  DEJON HERMOSILLO MD      Anesthesia: General  CRNA: Korin Ellis CRNA  Anesthesiologist [facility]: Vickie Monsalve MD    Findings: Urethral revealed a hypospadias at the meatus but the urethra was otherwise unremarkable.  Prostatic fossa had some small calcifications but was open with no evidence of obstruction.  Bladder showed bilateral Hutch diverticuli.  There was a small 5 mm red lesion on the high left lateral wall.  This was removed in its entirety biopsied and fulgurated.  Postvoid residual was low.  Bilateral retrograde pyelograms were normal.  Rectal exam revealed a 20 g smooth prostate no nodules symmetric.  No rectal masses stool was brown.    EBL: 0 mL    Complications: None    Specimens:    ID Type Source Tests Collected by Time Destination   1 : 1)LEFT HIGH LATERAL WALL BLADDER BIOPSY Tissue Bladder SURGICAL PATHOLOGY TISSUE Dejon Hermosillo MD 1/29/2024 1422        Condition:  stable    DEJON HERMOSILLO MD  1/29/2024  2:37 PM

## 2024-01-30 NOTE — OPERATIVE REPORT
[facility]    PATIENT'S NAME: ANTHONY CARRASCO   ATTENDING PHYSICIAN: Dejon Lopez MD   OPERATING PHYSICIAN: Dejon Lopez MD   PATIENT ACCOUNT#:   [id number]    LOCATION:  [facility] 1 [facility] 10  MEDICAL RECORD #:   [id number]       YOB: 1970  ADMISSION DATE:       01/29/2024      OPERATION DATE:  01/29/2024    OPERATIVE REPORT      PREOPERATIVE DIAGNOSIS:  History of gross and microscopic hematuria, history of left hydrocele, history of a left epididymo-orchitis.  POSTOPERATIVE DIAGNOSIS:  History of gross and microscopic hematuria, history of left hydrocele, history of a left epididymo-orchitis, small bladder lesion, bilateral Hutch diverticula, and hypospadias.  PROCEDURE:  Cystoscopy, bilateral retrograde pyelogram, fluoroscopy, removal of bladder lesion, fulguration, biopsy, and rectal exam.    ANESTHESIA:  General.     ESTIMATED BLOOD LOSS:  0 mL.     COMPLICATIONS:  None.    INDICATIONS:  This is a 53-year-old male who had an episode of gross hematuria and microscopic hematuria.  He had a CT scan which showed no significant urologic pathology.  Also, has had left epididymo-orchitis and now is better on antibiotics and scrotal support and scrotal elevation.  He now comes for completion of his hematuria evaluation.  I explained to him the procedure, risks, and complications.  He understands and accepts, desires us to proceed.  He also took antibiotics preoperatively.    FINDINGS:  Urethra revealed a hypospadias at the meatus, but urethra was otherwise unremarkable.  Prostatic fossa had some small calcifications, but was open with no evidence of obstruction.  Bladder showed bilateral Hutch diverticula.  There was a small 5 mm red lesion in the high left lateral wall.  This was removed in its entirety, biopsied, and fulgurated.  Postvoid residual was low.  Bilateral ureteropyelograms were normal.  Rectal exam revealed a 20 g prostate, smooth, no nodule, symmetric, no  rectal masses, and stool was brown.      OPERATIVE TECHNIQUE:  The patient was brought to the operating theater.  General anesthesia was administered.  He was carefully prepped and draped in usual sterile fashion in semi-lithotomy position.   x-rays were done.  These revealed no calcifications overlying the ureters or kidneys.  Next, we inserted the 22-Montserratian panendoscope with 30-degree lens and videoscope.  The urethra was normal.  There was a  hypospadias at the meatus but was not narrow.  The entire urethra otherwise was unremarkable.  Prostatic fossa was nonobstructing.  There were some calcifications.  Mild bilobar enlargement, not significant.  Bladder was entered and emptied.  There was a low postvoid residual which was cleared.  The bladder was carefully surveyed with 30-degree and 70-degree lens.  Both ureteral orifices were identified effluxing clear yellow urine.  There was no evidence of blood clot, bleeding, or stones.  There was a small red lesion 5 mm in the high left lateral wall.  There were also bilateral Hutch diverticula lateral to the ureteral orifices but not involving the ureteral orifices.  Firstly, we used a cone-tipped catheter with contrast mixed with gentamicin.  We cannulated first the left orifice and then the right.  We instilled contrast gently.  This opacified the systems well, revealing no filling defects or abnormalities.  There was nothing on today's exam that would explain the patient's microscopic hematuria of his upper tracts.  Next, we used a     70-degree lens and deflecting bridge, and used the flexible biopsy forceps, removed the red lesion in its entirety.  It was a red flat lesion probably the hematoma or a hemangioma.  Next, we used a Bugbee cautery.  We were using water and we cauterized this area completely.  This was sent to Pathology.  At this point, the bladder was emptied and the scope was removed.  We used fluoroscopy interpreting as we went to not over fill  or under fill the system.  Next, we did a rectal exam.  He had a 20 g prostate, smooth, no nodules, symmetric, no rectal mass and stool was brown.  At this point, the patient tolerated the procedure well and was transferred to the recovery room in stable condition.    FINAL ASSESSMENT:  Small bladder lesion removed.  The microscopic hematuria may be coming from the prostatic calcifications which were benign, but patient should watch for other causes of medical hematuria such as rheumatoid arthritis, diabetes, and connective tissue disorders, etc.  He will follow up with me in the office for following up on his left epididymo-orchitis and follow up on his left hydrocele.  Eventually, he says he wants a bilateral vasectomy.  At that time, he may want his left testicle just removed, so he does not have to have discomfort with it anymore.  I told him that he could also go to a pain clinic, get a second opinion at [facility] center and if this would be somewhat final to have a left orchiectomy that this is what he is leaning toward.  He understands he still may have pain even after the left orchiectomy.  Patient will follow up with me in 2 weeks and we will check the biopsy results.    Dictated By Dejon Lopez MD  d: 01/29/2024 14:45:33  t: 01/30/2024 01:24:39  Job [id number]  GJK/

## 2024-07-22 RX ORDER — FLUTICASONE PROPIONATE 50 MCG
1 SPRAY, SUSPENSION (ML) NASAL EVERY MORNING
Qty: 1 EACH | Refills: 0 | Status: SHIPPED | OUTPATIENT
Start: 2024-07-22

## 2024-07-22 NOTE — TELEPHONE ENCOUNTER
Refill request sent to pharmacy on 7/22/24 for Flonase, last OV on 8/7/23, 30 day supply refilled

## 2024-08-13 ENCOUNTER — OFFICE VISIT (OUTPATIENT)
Dept: OTOLARYNGOLOGY | Facility: CLINIC | Age: 54
End: 2024-08-13

## 2024-08-13 DIAGNOSIS — H92.03 OTALGIA OF BOTH EARS: Primary | ICD-10-CM

## 2024-08-13 PROCEDURE — 99213 OFFICE O/P EST LOW 20 MIN: CPT | Performed by: OTOLARYNGOLOGY

## 2024-08-13 RX ORDER — SULFAMETHOXAZOLE AND TRIMETHOPRIM 800; 160 MG/1; MG/1
1 TABLET ORAL 2 TIMES DAILY
COMMUNITY
Start: 2024-01-31

## 2024-08-13 RX ORDER — CLOTRIMAZOLE AND BETAMETHASONE DIPROPIONATE 10; .64 MG/G; MG/G
CREAM TOPICAL
COMMUNITY
Start: 2023-06-03

## 2024-08-13 RX ORDER — CYCLOBENZAPRINE HCL 5 MG
5 TABLET ORAL NIGHTLY
Qty: 30 TABLET | Refills: 1 | Status: SHIPPED | OUTPATIENT
Start: 2024-08-13

## 2024-08-13 RX ORDER — TIRZEPATIDE 5 MG/.5ML
INJECTION, SOLUTION SUBCUTANEOUS
COMMUNITY

## 2024-08-13 RX ORDER — CELECOXIB 200 MG/1
200 CAPSULE ORAL DAILY PRN
Qty: 30 CAPSULE | Refills: 0 | Status: SHIPPED | OUTPATIENT
Start: 2024-08-13 | End: 2024-09-12

## 2024-08-13 RX ORDER — HYDROXYCHLOROQUINE SULFATE 200 MG/1
TABLET, FILM COATED ORAL 2 TIMES DAILY
COMMUNITY
Start: 2024-04-22

## 2024-08-13 RX ORDER — METFORMIN HYDROCHLORIDE 500 MG/1
500 TABLET, EXTENDED RELEASE ORAL DAILY
COMMUNITY
Start: 2023-11-17

## 2024-08-13 NOTE — PROGRESS NOTES
Alexis Greer is a 53 year old male.    Chief Complaint   Patient presents with    Sinus Problem     Patient is here due to possible sinus infection, reports sinus pressure. Reports some sinus pain x 2 weeks.        HISTORY OF PRESENT ILLNESS  He presents with a recent CT scan performed in the last 2 weeks.  States that he has primary difficulty breathing through his nose but also complains of chronic sneezing congestive issues.  Currently on Singulair and a nasal spray which helped slightly but not enough.  CT scan does demonstrate no significant maxillary sinus with some mucosal thickening within the ethmoids.  Normal frontal sinus.  He does have a deviated septum as well as turbinate hypertrophy noted.  Here to discuss further management.  Currently on no antihistamines.     10/29/19 I last saw him in April and at that time we reviewed his CT scan which demonstrated deviated septum to the right as well as no significant sinus disease.  He did trial allergy medications including Singulair Claritin and nasal sprays with slight improvement in his congestive issues but continued nasal obstruction which worsens when he sleeps.  No sinus issues at this time.  No other complaints or concerns.  He is here today to discuss surgery once again.  We did discuss surgery at his previous visit and a problem at that time things, per summer not allowing him to proceed therefore he is here again to go over surgical issues once again.  No changes in his history and physical.      12/3/19 8 days out from septoplasty and turbinate reduction.  Doing very well and breathing much better.  Very happy with the results of his surgery.     2/28/20 he is about 3 months out from septoplasty and turbinate reduction.  He did well up until a few days ago he started developing nasal mucosa congestion and coughing up and blowing out green-yellow material from his nose and throat.  Sinuses feel full and pressured.  Symptoms seem to have  worsened with change in temperature and environment.  Currently using Singulair on a daily basis but not using any other medications or sprays.  Breathing is overall improved despite acute sinus-like symptoms at this time.  No tobacco use.  Allergies?  I do not suspect he has had any previous allergy testing.  No other signs, symptoms or complaints      1/19/21 I last saw him about a year ago.  He did quite well up until about 2 to 3 weeks ago when he started having some congestive issues and chronic cough which is productive of green mucus.  He did have COVID-19 back in March 2020 was hospitalized for 10 days with complete resolution of his shortness of breath over a period of months.  He did well over the fall and early winter and now presents with worsening facial pressure postnasal discharge and productive cough which worsens when he sleeps.  No other signs, symptoms or complaints at this time.  Currently using levocetirizine and Singulair.     2/26/21 he presents with resolution of his sinus infection with no further green mucus production or sinus pressure.  Still having congestion especially when trying to sleep at night.  He did have COVID-19 back in March 2020 and was hospitalized for 10 days.  Continues to have a feeling of blockage in his nose.  He has been on Singulair loratadine Astelin as well as Sudafed with incomplete resolution of the symptoms.  Feels that the Sudafed is not really helping too much of congestive issues.  He does feel that the other medications seem to help to some extent.  Positive postnasal discharge and throat symptoms such as cough and throat clearing.  No other signs, symptoms or complaints.     3/26/21 doing very well.  Last visit started on glycopyrrolate.  He has been using Singulair loratadine fluticasone and Astelin nasal spray.  He has not been using Sudafed.  On glycopyrrolate he noted complete resolution of his throat symptoms and even his congestive issues and feels  very good.  No other sites, symptoms or complaints at this time.      11/2/21 he feels a burning sensation intranasally bilaterally.  This started just a few days ago.  Still using Singulair loratadine fluticasone Astelin but no longer using glycopyrrolate.  Uses Sudafed as needed.  No other signs, symptoms or complaints     11/23/21 recently had some significant nasal pressure and frontal sinus discomfort treated with antibiotics and a nasal spray with resolution of his symptoms.  He is essentially using Singulair loratadine fluticasone and Astelin as well as Sudafed and no longer using glycopyrrolate as he is not having significant postnasal discharge.  Despite all of these medications he continues to have issues with nasal congestion and sinus pressure.  Previous septoplasty many years ago.  No breathing complaints at this time prednisone seem to help with his sinus pressure.  History of allergy testing and told that he had no allergies.  He does have constant issues with nasal congestion and sinus congestion.  No nasal mucopurulence at this time no other signs, symptoms or complaints.  Last visit he was started on Augmentin for 10 days     5/7/22 he was actually doing remarkably well up until about a week ago when he started developing some congestive issues.  He was exposed to his son who was coughing and probably had a cold and he subsequently developed significant congestion with postnasal discharge which is clear which over the last 5 days or so has turned into thick yellowish drainage that he is coughing up and blowing out of his nose.  Currently on Singulair Astelin fluticasone and Claritin.  Feels very congested and states that this seems to be one of the worst sinus infections he has ever had.  Used Afrin for about 4 days due to the severity of his congestion this did help.      8/13/24 presents today with concerns about facial pain and discomfort.  He has been having some facial pressure discomfort for  the past 2 weeks especially in and around the ears and the forehead and eyes.  No nasal mucopurulence clear mucus at most some throat clearing but nothing else.  No other signs, symptoms or complaints denies grinding clenching behavior.             Social History     Socioeconomic History    Marital status:    Tobacco Use    Smoking status: Never    Smokeless tobacco: Never   Vaping Use    Vaping status: Never Used   Substance and Sexual Activity    Alcohol use: Not Currently     Comment: 1-2x/month    Drug use: Not Currently     Types: Cannabis     Comment: last use 1/1/24       Family History   Problem Relation Age of Onset    Diabetes Brother     High Blood Pressure Brother     Heart Disorder Brother     Hypertension Brother     Hypertension Father     Other (Diverticulitis) Father        Past Medical History:    Chronic sinusitis    Deviated nasal septum    Diverticulosis    Elevated blood pressure reading    GERD (gastroesophageal reflux disease)    History of diverticulitis    Hypercholesterolemia    Morbid obesity due to excess calories (HCC)    Obstructive sleep apnea syndrome    Paroxysmal atrial fibrillation (HCC)    Sleep apnea    uses CPAP    Torn meniscus    LEFT LEG    Urinary tract infection    Visual impairment    glasses for reading    Vitamin D deficiency       Past Surgical History:   Procedure Laterality Date    Colonoscopy      Excision turbinate,submucous  11/25/2019    Other surgical history Left 12/18/2019    left meniscal repair    Repair of nasal septum  11/25/2019         REVIEW OF SYSTEMS    System Neg/Pos Details   Constitutional Negative Fatigue, fever and weight loss.   ENMT Negative Drooling.   Eyes Negative Blurred vision and vision changes.   Respiratory Negative Dyspnea and wheezing.   Cardio Negative Chest pain, irregular heartbeat/palpitations and syncope.   GI Negative Abdominal pain and diarrhea.   Endocrine Negative Cold intolerance and heat intolerance.   Neuro  Negative Tremors.   Psych Negative Anxiety and depression.   Integumentary Negative Frequent skin infections, pigment change and rash.   Hema/Lymph Negative Easy bleeding and easy bruising.           PHYSICAL EXAM    There were no vitals taken for this visit.       Constitutional Normal Overall appearance - Normal.   Psychiatric Normal Orientation - Oriented to time, place, person & situation. Appropriate mood and affect.   Neck Exam Normal Inspection - Normal. Palpation - Normal. Parotid gland - Normal. Thyroid gland - Normal.   Eyes Normal Conjunctiva - Right: Normal, Left: Normal. Pupil - Right: Normal, Left: Normal. Fundus - Right: Normal, Left: Normal.   Neurological Normal Memory - Normal. Cranial nerves - Cranial nerves II through XII grossly intact.   Head/Face Normal Facial features - Normal. Eyebrows - Normal. Skull - Normal.        Nasopharynx Normal External nose - Normal. Lips/teeth/gums - Normal. Tonsils - Normal. Oropharynx - Normal.   Ears Normal Inspection - Right: Normal, Left: Normal. Canal - Right: Normal, Left: Normal. TM - Right: Normal, Left: Normal.   Skin Normal Inspection - Normal.        Lymph Detail Normal Submental. Submandibular. Anterior cervical. Posterior cervical. Supraclavicular.   TMJ  Very tender to palpation bilaterally   Nose/Mouth/Throat Normal External nose - Normal. Lips/teeth/gums - Normal. Tonsils - Normal. Oropharynx - Normal.   Nose/Mouth/Throat Normal Nares - Right: Normal Left: Normal. Septum -Normal  Turbinates - Right: Normal, Left: Normal.       Current Outpatient Medications:     clotrimazole-betamethasone 1-0.05 % External Cream, External application as directed daily x 10 days, Disp: , Rfl:     hydroxychloroquine 200 MG Oral Tab, Take by mouth 2 (two) times a day., Disp: , Rfl:     metFORMIN  MG Oral Tablet 24 Hr, Take 1 tablet (500 mg total) by mouth daily., Disp: , Rfl:     sulfamethoxazole-trimethoprim -160 MG Oral Tab per tablet, Take 1 tablet by  mouth 2 (two) times daily., Disp: , Rfl:     ZEPBOUND 5 MG/0.5ML Subcutaneous Solution Auto-injector, INJECT 5 MG UNDER THE SKIN ONE DAY A WEEK, Disp: , Rfl:     celecoxib 200 MG Oral Cap, Take 1 capsule (200 mg total) by mouth daily as needed for Pain., Disp: 30 capsule, Rfl: 0    cyclobenzaprine 5 MG Oral Tab, Take 1 tablet (5 mg total) by mouth nightly., Disp: 30 tablet, Rfl: 1    fluticasone propionate 50 MCG/ACT Nasal Suspension, 1 spray by Nasal route every morning., Disp: 1 each, Rfl: 0    acetaminophen 500 MG Oral Tab, Take 2 tablets (1,000 mg total) by mouth every 6 (six) hours as needed for Pain., Disp: , Rfl:     phenazopyridine HCl (AZO TABS) 95 MG Oral Tab, Take 1 tablet (95 mg total) by mouth 3 (three) times daily as needed for Pain., Disp: 21 tablet, Rfl: 5    semaglutide-weight management (WEGOVY) 0.25 MG/0.5ML Subcutaneous Solution Auto-injector, Inject 0.5 mL (0.25 mg total) into the skin once a week., Disp: , Rfl:     levoFLOXacin 500 MG Oral Tab, Take 1 tablet (500 mg total) by mouth daily., Disp: , Rfl:     Azelastine HCl 137 MCG/SPRAY Nasal Solution, USE 2 SPRAYS IN EACH NOSTRIL TWICE A DAY AS DIRECTED, Disp: 90 mL, Rfl: 1    Meloxicam 15 MG Oral Tab, Take 1 tablet (15 mg total) by mouth daily as needed for Pain., Disp: , Rfl:     azelastine 0.1 % Nasal Solution, 2 sprays by Nasal route 2 (two) times daily., Disp: 30 mL, Rfl: 0    ESOMEPRAZOLE MAGNESIUM 20 MG Oral Capsule Delayed Release, TAKE 1 CAPSULE EVERY MORNING BEFORE A MEAL AS NEEDED (BREAKFAST), Disp: 90 capsule, Rfl: 1    clotrimazole 1 % External Cream, Apply to affected area(s) BID until resolved., Disp: 24 g, Rfl: 0    Ascorbic Acid (VITAMIN C) 500 MG Oral Cap, Take 1 each by mouth 2 (two) times a day., Disp: 60 capsule, Rfl: 5    Vitamin B-12 1000 MCG Oral Tab, Take 1 tablet (1,000 mcg total) by mouth daily., Disp: , Rfl:     Cholecalciferol (VITAMIN D3) 50 MCG (2000 UT) Oral Tab, Take 1 tablet (2,000 Units total) by mouth daily.,  Disp: , Rfl:   ASSESSMENT AND PLAN    1. Otalgia of both ears  Symptoms today appear to be more musculoskeletal in nature as he does not really seem to have any signs or symptoms suggestive of a infectious sinusitis.  I have asked him to start Celebrex cyclobenzaprine warm heat soft diet and to chew on both sides of mouth.  I have asked him to meet with a dentist to see whether or not he grinds or clenches.  Return to see me in 1        This note was prepared using Dragon [employer] voice recognition dictation software. As a result errors may occur. When identified these errors have been corrected. While every attempt is made to correct errors during dictation discrepancies may still exist    Frank Adkins MD    8/13/2024    2:18 PM

## 2024-08-15 RX ORDER — AZELASTINE HYDROCHLORIDE 137 UG/1
SPRAY, METERED NASAL
Qty: 120 ML | Refills: 3 | Status: SHIPPED | OUTPATIENT
Start: 2024-08-15

## 2024-09-19 ENCOUNTER — LAB ENCOUNTER (OUTPATIENT)
Dept: LAB | Facility: HOSPITAL | Age: 54
End: 2024-09-19
Attending: UROLOGY
Payer: COMMERCIAL

## 2024-09-19 DIAGNOSIS — Z12.5 PROSTATE CANCER SCREENING: ICD-10-CM

## 2024-09-19 LAB — COMPLEXED PSA SERPL-MCNC: 2.51 NG/ML (ref ?–4)

## 2024-09-19 PROCEDURE — 36415 COLL VENOUS BLD VENIPUNCTURE: CPT

## 2024-09-20 RX ORDER — FLUTICASONE PROPIONATE 50 MCG
1 SPRAY, SUSPENSION (ML) NASAL EVERY MORNING
Qty: 16 G | Refills: 5 | Status: SHIPPED | OUTPATIENT
Start: 2024-09-20

## 2024-09-20 NOTE — TELEPHONE ENCOUNTER
Rx refill request for Fluticasone.    LOV: 8/13/2024    Future appt: None    Rx refill per protocol.

## 2024-12-27 ENCOUNTER — TELEPHONE (OUTPATIENT)
Dept: OTOLARYNGOLOGY | Facility: CLINIC | Age: 54
End: 2024-12-27

## 2024-12-27 NOTE — TELEPHONE ENCOUNTER
Per patient calling asking if there was a possibility he could be seen before 12/31 due to the extent of the symptoms he is experiencing. Per patient said that he would be willing to see any ENT provider that can take him. Please advise.    supervision negative...

## 2024-12-27 NOTE — TELEPHONE ENCOUNTER
Pt c/o sinus running with thick yellow secretions, nasal drip going into throat causing it to feel sore and strong cough causing body aches. He states taking OTC mucinex, and pseudoephedrine but nothing seems to help. Pt denies fever or any flu-like symptoms besides body aches. He has an appointment scheduled on 12/31 with Dr. Adkins, but hoping to be sooner due to symptoms, RN informed Dr. Adkins.

## 2024-12-30 NOTE — TELEPHONE ENCOUNTER
Per Dr. Adkins- he has placed an order for amoxicillin-clav. RN left message via voicemail and [facility].

## 2024-12-31 ENCOUNTER — OFFICE VISIT (OUTPATIENT)
Dept: OTOLARYNGOLOGY | Facility: CLINIC | Age: 54
End: 2024-12-31
Payer: COMMERCIAL

## 2024-12-31 DIAGNOSIS — J01.00 ACUTE MAXILLARY SINUSITIS, RECURRENCE NOT SPECIFIED: ICD-10-CM

## 2024-12-31 DIAGNOSIS — R07.0 THROAT PAIN: ICD-10-CM

## 2024-12-31 DIAGNOSIS — R09.82 POSTNASAL DISCHARGE: Primary | ICD-10-CM

## 2024-12-31 PROCEDURE — 99213 OFFICE O/P EST LOW 20 MIN: CPT | Performed by: OTOLARYNGOLOGY

## 2024-12-31 RX ORDER — GLYCOPYRROLATE 1 MG/1
1 TABLET ORAL 3 TIMES DAILY
Qty: 90 TABLET | Refills: 1 | Status: SHIPPED | OUTPATIENT
Start: 2024-12-31

## 2024-12-31 NOTE — PROGRESS NOTES
Alexis Greer is a 54 year old male.    Chief Complaint   Patient presents with    Throat Problem     Patient is here due sore throat and cough    Sinus Problem     Patient reports post nasal drip       HISTORY OF PRESENT ILLNESS  He presents with a recent CT scan performed in the last 2 weeks.  States that he has primary difficulty breathing through his nose but also complains of chronic sneezing congestive issues.  Currently on Singulair and a nasal spray which helped slightly but not enough.  CT scan does demonstrate no significant maxillary sinus with some mucosal thickening within the ethmoids.  Normal frontal sinus.  He does have a deviated septum as well as turbinate hypertrophy noted.  Here to discuss further management.  Currently on no antihistamines.     10/29/19 I last saw him in April and at that time we reviewed his CT scan which demonstrated deviated septum to the right as well as no significant sinus disease.  He did trial allergy medications including Singulair Claritin and nasal sprays with slight improvement in his congestive issues but continued nasal obstruction which worsens when he sleeps.  No sinus issues at this time.  No other complaints or concerns.  He is here today to discuss surgery once again.  We did discuss surgery at his previous visit and a problem at that time things, per summer not allowing him to proceed therefore he is here again to go over surgical issues once again.  No changes in his history and physical.      12/3/19 8 days out from septoplasty and turbinate reduction.  Doing very well and breathing much better.  Very happy with the results of his surgery.     2/28/20 he is about 3 months out from septoplasty and turbinate reduction.  He did well up until a few days ago he started developing nasal mucosa congestion and coughing up and blowing out green-yellow material from his nose and throat.  Sinuses feel full and pressured.  Symptoms seem to have worsened with  change in temperature and environment.  Currently using Singulair on a daily basis but not using any other medications or sprays.  Breathing is overall improved despite acute sinus-like symptoms at this time.  No tobacco use.  Allergies?  I do not suspect he has had any previous allergy testing.  No other signs, symptoms or complaints      1/19/21 I last saw him about a year ago.  He did quite well up until about 2 to 3 weeks ago when he started having some congestive issues and chronic cough which is productive of green mucus.  He did have COVID-19 back in March 2020 was hospitalized for 10 days with complete resolution of his shortness of breath over a period of months.  He did well over the fall and early winter and now presents with worsening facial pressure postnasal discharge and productive cough which worsens when he sleeps.  No other signs, symptoms or complaints at this time.  Currently using levocetirizine and Singulair.     2/26/21 he presents with resolution of his sinus infection with no further green mucus production or sinus pressure.  Still having congestion especially when trying to sleep at night.  He did have COVID-19 back in March 2020 and was hospitalized for 10 days.  Continues to have a feeling of blockage in his nose.  He has been on Singulair loratadine Astelin as well as Sudafed with incomplete resolution of the symptoms.  Feels that the Sudafed is not really helping too much of congestive issues.  He does feel that the other medications seem to help to some extent.  Positive postnasal discharge and throat symptoms such as cough and throat clearing.  No other signs, symptoms or complaints.     3/26/21 doing very well.  Last visit started on glycopyrrolate.  He has been using Singulair loratadine fluticasone and Astelin nasal spray.  He has not been using Sudafed.  On glycopyrrolate he noted complete resolution of his throat symptoms and even his congestive issues and feels very good.  No  other sites, symptoms or complaints at this time.      11/2/21 he feels a burning sensation intranasally bilaterally.  This started just a few days ago.  Still using Singulair loratadine fluticasone Astelin but no longer using glycopyrrolate.  Uses Sudafed as needed.  No other signs, symptoms or complaints     11/23/21 recently had some significant nasal pressure and frontal sinus discomfort treated with antibiotics and a nasal spray with resolution of his symptoms.  He is essentially using Singulair loratadine fluticasone and Astelin as well as Sudafed and no longer using glycopyrrolate as he is not having significant postnasal discharge.  Despite all of these medications he continues to have issues with nasal congestion and sinus pressure.  Previous septoplasty many years ago.  No breathing complaints at this time prednisone seem to help with his sinus pressure.  History of allergy testing and told that he had no allergies.  He does have constant issues with nasal congestion and sinus congestion.  No nasal mucopurulence at this time no other signs, symptoms or complaints.  Last visit he was started on Augmentin for 10 days     5/7/22 he was actually doing remarkably well up until about a week ago when he started developing some congestive issues.  He was exposed to his son who was coughing and probably had a cold and he subsequently developed significant congestion with postnasal discharge which is clear which over the last 5 days or so has turned into thick yellowish drainage that he is coughing up and blowing out of his nose.  Currently on Singulair Astelin fluticasone and Claritin.  Feels very congested and states that this seems to be one of the worst sinus infections he has ever had.  Used Afrin for about 4 days due to the severity of his congestion this did help.      8/13/24 presents today with concerns about facial pain and discomfort.  He has been having some facial pressure discomfort for the past 2  weeks especially in and around the ears and the forehead and eyes.  No nasal mucopurulence clear mucus at most some throat clearing but nothing else.  No other signs, symptoms or complaints denies grinding clenching behavior.     12/31/24 he started developing some sinus pressure and discomfort recently.  Typically he will stay on his allergy medications and sprays as well as decongestants with resolution of his sinus symptoms over 3 to 4-day period.  Despite all of his medications at this time unfortunately he started developing some nasal mucopurulence and facial pressure and discomfort.  Mostly bothered by the chronic postnasal discharge.  Using Mucinex D montelukast nasal sprays with continued postnasal drip.  He called our office last week and he only picked up his antibiotics yesterday.  Please been on the Augmentin 8 7 5 mg p.o. twice daily and has taken 1 dose so far.  Still complains of facial pressure and discomfort.      Social History     Socioeconomic History    Marital status:    Tobacco Use    Smoking status: Never    Smokeless tobacco: Never   Vaping Use    Vaping status: Never Used   Substance and Sexual Activity    Alcohol use: Not Currently     Comment: 1-2x/month    Drug use: Not Currently     Types: Cannabis     Comment: last use 1/1/24       Family History   Problem Relation Age of Onset    Diabetes Brother     High Blood Pressure Brother     Heart Disorder Brother     Hypertension Brother     Hypertension Father     Other (Diverticulitis) Father        Past Medical History:    Chronic sinusitis    Deviated nasal septum    Diverticulosis    Elevated blood pressure reading    GERD (gastroesophageal reflux disease)    History of diverticulitis    Hypercholesterolemia    Morbid obesity due to excess calories (HCC)    Obstructive sleep apnea syndrome    Paroxysmal atrial fibrillation (HCC)    Sleep apnea    uses CPAP    Torn meniscus    LEFT LEG    Urinary tract infection    Visual  impairment    glasses for reading    Vitamin D deficiency       Past Surgical History:   Procedure Laterality Date    Colonoscopy      Excision turbinate,submucous  11/25/2019    Other surgical history Left 12/18/2019    left meniscal repair    Repair of nasal septum  11/25/2019         REVIEW OF SYSTEMS    System Neg/Pos Details   Constitutional Negative Fatigue, fever and weight loss.   ENMT Negative Drooling.   Eyes Negative Blurred vision and vision changes.   Respiratory Negative Dyspnea and wheezing.   Cardio Negative Chest pain, irregular heartbeat/palpitations and syncope.   GI Negative Abdominal pain and diarrhea.   Endocrine Negative Cold intolerance and heat intolerance.   Neuro Negative Tremors.   Psych Negative Anxiety and depression.   Integumentary Negative Frequent skin infections, pigment change and rash.   Hema/Lymph Negative Easy bleeding and easy bruising.           PHYSICAL EXAM    There were no vitals taken for this visit.       Constitutional Normal Overall appearance - Normal.   Psychiatric Normal Orientation - Oriented to time, place, person & situation. Appropriate mood and affect.   Neck Exam Normal Inspection - Normal. Palpation - Normal. Parotid gland - Normal. Thyroid gland - Normal.   Eyes Normal Conjunctiva - Right: Normal, Left: Normal. Pupil - Right: Normal, Left: Normal. Fundus - Right: Normal, Left: Normal.   Neurological Normal Memory - Normal. Cranial nerves - Cranial nerves II through XII grossly intact.   Head/Face Normal Facial features - Normal. Eyebrows - Normal. Skull - Normal.        Nasopharynx Normal External nose - Normal. Lips/teeth/gums - Normal. Tonsils - Normal. Oropharynx - Normal.   Ears Normal Inspection - Right: Normal, Left: Normal. Canal - Right: Normal, Left: Normal. TM - Right: Normal, Left: Normal.   Skin Normal Inspection - Normal.        Lymph Detail Normal Submental. Submandibular. Anterior cervical. Posterior cervical. Supraclavicular.         Nose/Mouth/Throat Normal External nose - Normal. Lips/teeth/gums - Normal. Tonsils - Normal. Oropharynx - Normal.   Nose/Mouth/Throat Normal Nares - Right: Normal Left: Normal. Septum -Normal  Turbinates - Right: Normal, Left: Normal.  Significant nasal mucosal congestion bilaterally       Current Outpatient Medications:     glycopyrrolate 1 MG Oral Tab, Take 1 tablet (1 mg total) by mouth 3 (three) times daily., Disp: 90 tablet, Rfl: 1    amoxicillin clavulanate 875-125 MG Oral Tab, Take 1 tablet by mouth every 12 (twelve) hours., Disp: 20 tablet, Rfl: 0    FLUTICASONE PROPIONATE 50 MCG/ACT Nasal Suspension, USE 1 SPRAY NASALLY EVERY MORNING, Disp: 16 g, Rfl: 5    AZELASTINE 137 MCG/SPRAY Nasal Solution, USE 2 SPRAYS IN EACH NOSTRIL TWICE A DAY AS DIRECTED, Disp: 120 mL, Rfl: 3    clotrimazole-betamethasone 1-0.05 % External Cream, External application as directed daily x 10 days, Disp: , Rfl:     hydroxychloroquine 200 MG Oral Tab, Take by mouth 2 (two) times a day., Disp: , Rfl:     metFORMIN  MG Oral Tablet 24 Hr, Take 1 tablet (500 mg total) by mouth daily., Disp: , Rfl:     sulfamethoxazole-trimethoprim -160 MG Oral Tab per tablet, Take 1 tablet by mouth 2 (two) times daily., Disp: , Rfl:     ZEPBOUND 5 MG/0.5ML Subcutaneous Solution Auto-injector, INJECT 5 MG UNDER THE SKIN ONE DAY A WEEK, Disp: , Rfl:     cyclobenzaprine 5 MG Oral Tab, Take 1 tablet (5 mg total) by mouth nightly., Disp: 30 tablet, Rfl: 1    acetaminophen 500 MG Oral Tab, Take 2 tablets (1,000 mg total) by mouth every 6 (six) hours as needed for Pain., Disp: , Rfl:     phenazopyridine HCl (AZO TABS) 95 MG Oral Tab, Take 1 tablet (95 mg total) by mouth 3 (three) times daily as needed for Pain., Disp: 21 tablet, Rfl: 5    semaglutide-weight management (WEGOVY) 0.25 MG/0.5ML Subcutaneous Solution Auto-injector, Inject 0.5 mL (0.25 mg total) into the skin once a week., Disp: , Rfl:     levoFLOXacin 500 MG Oral Tab, Take 1 tablet  (500 mg total) by mouth daily., Disp: , Rfl:     Meloxicam 15 MG Oral Tab, Take 1 tablet (15 mg total) by mouth daily as needed for Pain., Disp: , Rfl:     azelastine 0.1 % Nasal Solution, 2 sprays by Nasal route 2 (two) times daily., Disp: 30 mL, Rfl: 0    ESOMEPRAZOLE MAGNESIUM 20 MG Oral Capsule Delayed Release, TAKE 1 CAPSULE EVERY MORNING BEFORE A MEAL AS NEEDED (BREAKFAST), Disp: 90 capsule, Rfl: 1    clotrimazole 1 % External Cream, Apply to affected area(s) BID until resolved., Disp: 24 g, Rfl: 0    Ascorbic Acid (VITAMIN C) 500 MG Oral Cap, Take 1 each by mouth 2 (two) times a day., Disp: 60 capsule, Rfl: 5    Vitamin B-12 1000 MCG Oral Tab, Take 1 tablet (1,000 mcg total) by mouth daily., Disp: , Rfl:     Cholecalciferol (VITAMIN D3) 50 MCG (2000 UT) Oral Tab, Take 1 tablet (2,000 Units total) by mouth daily., Disp: , Rfl:   ASSESSMENT AND PLAN    1. Postnasal discharge    2. Throat pain    3. Acute maxillary sinusitis, recurrence not specified  Throat pain most likely due to his chronic postnasal drip.  I did ask him to continue with his antibiotic and finish the full 10 days worth and to continue with the Mucinex D Singulair and nasal sprays and he will call in some glycopyrrolate.  He does understand that this could dry him significantly therefore he has been instructed to use half a tab 3 times a day for extreme dryness.  Return to see me in 1 month if no better.        This note was prepared using Dragon [employer] voice recognition dictation software. As a result errors may occur. When identified these errors have been corrected. While every attempt is made to correct errors during dictation discrepancies may still exist    Frank Adkins MD    12/31/2024    8:10 AM

## 2025-02-04 NOTE — TELEPHONE ENCOUNTER
90 day prescription request for   Glycopyrrolate 1 mg  Quant-270.0  Take 1 tablet by mouth 3 times daily

## 2025-02-04 NOTE — TELEPHONE ENCOUNTER
This refill request is being sent to the provider for the following reason:  []Patient has not had an appointment within the past 12 months but has made an appointment on: ___  [x]Medication is not within protocol- Glycopyrrolate 1MG  [x]Patient did not complete follow up recommendations- 1 month follow up  []Other: ___

## 2025-02-10 RX ORDER — GLYCOPYRROLATE 1 MG/1
1 TABLET ORAL 3 TIMES DAILY
Qty: 90 TABLET | Refills: 1 | Status: SHIPPED | OUTPATIENT
Start: 2025-02-10

## 2025-03-13 ENCOUNTER — LAB ENCOUNTER (OUTPATIENT)
Dept: LAB | Facility: HOSPITAL | Age: 55
End: 2025-03-13
Attending: UROLOGY
Payer: COMMERCIAL

## 2025-03-13 DIAGNOSIS — R97.20 ELEVATED PSA: ICD-10-CM

## 2025-03-13 LAB — PSA SERPL-MCNC: 2.64 NG/ML (ref ?–4)

## 2025-03-13 PROCEDURE — 36415 COLL VENOUS BLD VENIPUNCTURE: CPT

## 2025-03-13 PROCEDURE — 84153 ASSAY OF PSA TOTAL: CPT

## 2025-05-05 NOTE — TELEPHONE ENCOUNTER
Called and spoke with patient. Patient requesting a letter for work stating he is having surgery. Letter generated will  at . Letter left at .
Pt requesting to speak with Andre Bear in regards to questions about sx.  Pt states he needs a work note that states the sx date and a brief description of sx
[facility]

## 2025-08-14 RX ORDER — AZELASTINE HYDROCHLORIDE 137 UG/1
2 SPRAY, METERED NASAL 2 TIMES DAILY
Qty: 120 ML | Refills: 0 | Status: SHIPPED | OUTPATIENT
Start: 2025-08-14

## (undated) DIAGNOSIS — R77.1 ELEVATED SERUM GLOBULIN LEVEL: Primary | ICD-10-CM

## (undated) DIAGNOSIS — R76.8 ELEVATED IMMUNOGLOBULIN A: ICD-10-CM

## (undated) DEVICE — APPLICATOR COTTON TIP 3 10/PK

## (undated) DEVICE — ENCORE® LATEX ACCLAIM SIZE 8, STERILE LATEX POWDER-FREE SURGICAL GLOVE: Brand: ENCORE

## (undated) DEVICE — PACKING 8CM LNG SPT NSL STNG

## (undated) DEVICE — SUTURE PLAIN GUT 5-0 PC-1

## (undated) DEVICE — GAMMEX® PI HYBRID SIZE 8.5, STERILE POWDER-FREE SURGICAL GLOVE, POLYISOPRENE AND NEOPRENE BLEND: Brand: GAMMEX

## (undated) DEVICE — CATHETER URET L70CM 4FR POLYUR CONE TIP DISP

## (undated) DEVICE — UROLOGY DRAIN BAG

## (undated) DEVICE — STERILE H2O FOR IRRIG .

## (undated) DEVICE — REFLEX ULTRA 45 WITH INTEGRATED CABLE: Brand: COBLATION

## (undated) DEVICE — SUCTION CANISTER, 3000CC,SAFELINER: Brand: DEROYAL

## (undated) DEVICE — SUTURE PLAIN GUT 4-0 SC-1

## (undated) DEVICE — HEAD & NECK: Brand: MEDLINE INDUSTRIES, INC.

## (undated) DEVICE — SLEEVE COMPR M KNEE LEN SGL USE KENDALL SCD

## (undated) DEVICE — SOL  .9 1000ML BTL

## (undated) DEVICE — NASAL ACCESSORY: Brand: MEDLINE INDUSTRIES, INC.

## (undated) DEVICE — SOLUTION IRRIG 1000ML 0.9% NACL USP BTL

## (undated) DEVICE — CYSTO PACK: Brand: MEDLINE INDUSTRIES, INC.

## (undated) NOTE — LETTER
[address]  [address] [facility],[address], [address]        06/18/18        Patient: Divina Vega   YOB: 1970   Date of Visit: 6/18/2018       Dear  Dr. Iain Diggs,      Thank you for referring Divina Vega to my practice.   Pl

## (undated) NOTE — LETTER
11/8/2019          To Whom It May Concern:    Taylor Kemp is scheduled for a surgical procedure on 11-25-19. A return to work date will be determined at patient's post operative appointment 7-10 days from scheduled surgery date.  If you require add

## (undated) NOTE — LETTER
4/27/2020          To Whom It May Concern:    Caleb Corona is currently under my medical care . He has had a slow recovery from his recent pneumonia continues to be quite short of breath. It is recommended that he be off work for 1 more week.   He